# Patient Record
Sex: FEMALE | Race: WHITE | NOT HISPANIC OR LATINO | Employment: OTHER | URBAN - METROPOLITAN AREA
[De-identification: names, ages, dates, MRNs, and addresses within clinical notes are randomized per-mention and may not be internally consistent; named-entity substitution may affect disease eponyms.]

---

## 2017-02-02 ENCOUNTER — PATIENT MESSAGE (OUTPATIENT)
Dept: GASTROENTEROLOGY | Facility: CLINIC | Age: 72
End: 2017-02-02

## 2017-03-01 ENCOUNTER — TELEPHONE (OUTPATIENT)
Dept: GASTROENTEROLOGY | Facility: CLINIC | Age: 72
End: 2017-03-01

## 2017-03-01 NOTE — TELEPHONE ENCOUNTER
----- Message from Rach Rockwell sent at 3/1/2017  3:22 PM CST -----  Contact: pt  States she's returning a call, she's not sure who called her. Please call pt at [phone]. Thank you

## 2017-03-02 ENCOUNTER — HOSPITAL ENCOUNTER (OUTPATIENT)
Dept: RADIOLOGY | Facility: HOSPITAL | Age: 72
Discharge: HOME | End: 2017-03-02
Attending: NURSE PRACTITIONER
Payer: MEDICARE

## 2017-03-02 ENCOUNTER — OFFICE VISIT (OUTPATIENT)
Dept: GASTROENTEROLOGY | Facility: CLINIC | Age: 72
End: 2017-03-02
Payer: MEDICARE

## 2017-03-02 VITALS
WEIGHT: 121.25 LBS | SYSTOLIC BLOOD PRESSURE: 130 MMHG | HEART RATE: 64 BPM | DIASTOLIC BLOOD PRESSURE: 70 MMHG | HEIGHT: 60 IN | BODY MASS INDEX: 23.81 KG/M2

## 2017-03-02 DIAGNOSIS — K59.00 FECAL INCONTINENCE ALTERNATING WITH CONSTIPATION: ICD-10-CM

## 2017-03-02 DIAGNOSIS — R19.4 ALTERED BOWEL HABITS: ICD-10-CM

## 2017-03-02 DIAGNOSIS — K63.5 POLYP OF COLON, UNSPECIFIED PART OF COLON, UNSPECIFIED TYPE: ICD-10-CM

## 2017-03-02 DIAGNOSIS — R19.4 ALTERED BOWEL HABITS: Primary | ICD-10-CM

## 2017-03-02 DIAGNOSIS — R15.9 FECAL INCONTINENCE ALTERNATING WITH CONSTIPATION: ICD-10-CM

## 2017-03-02 PROCEDURE — 74020 XR ABDOMEN FLAT AND ERECT: CPT | Mod: 26,,, | Performed by: RADIOLOGY

## 2017-03-02 PROCEDURE — 74020 XR ABDOMEN FLAT AND ERECT: CPT | Mod: TC,PO

## 2017-03-02 PROCEDURE — 99999 PR PBB SHADOW E&M-EST. PATIENT-LVL III: CPT | Mod: PBBFAC,,, | Performed by: NURSE PRACTITIONER

## 2017-03-02 PROCEDURE — 99214 OFFICE O/P EST MOD 30 MIN: CPT | Mod: S$PBB,,, | Performed by: NURSE PRACTITIONER

## 2017-03-02 RX ORDER — SODIUM, POTASSIUM,MAG SULFATES 17.5-3.13G
1 SOLUTION, RECONSTITUTED, ORAL ORAL ONCE
Qty: 1 BOTTLE | Refills: 0 | Status: SHIPPED | OUTPATIENT
Start: 2017-03-02 | End: 2017-03-02

## 2017-03-02 NOTE — PROGRESS NOTES
Clinic Follow Up:  [facility] Follow Up Note    Reason for Follow Up:  The primary encounter diagnosis was Altered bowel habits. Diagnoses of Fecal incontinence alternating with constipation and Polyp of colon, unspecified part of colon, unspecified type were also pertinent to this visit.    PCP: Primary Doctor No       HPI:  This is a 71 y.o. female here for follow up of the above  She reports no significant improvement in her abdominal discomfort or constipation.  She is no beginning to have occasional fecal incontinence and urgency.    She reports a previous hx of SBO requiring a hospitalization in [place] bout 3 years ago.  At that time, she had a colonoscopy completed but reports that the prep was poor.   She does have a hx of colon polyps, benign per pt.  She denies any melena or hematochezia.  Weight is stable.     Review of Systems   Constitutional: Negative for chills, fever, malaise/fatigue and weight loss.   Respiratory: Negative for cough.    Cardiovascular: Negative for chest pain.   Gastrointestinal:        Per HPI   Musculoskeletal: Negative for myalgias.   Skin: Negative for itching and rash.   Neurological: Negative for headaches.   Psychiatric/Behavioral: The patient is not nervous/anxious.        Medical History:  Past Medical History:   Diagnosis Date    Anemia     Periodic limb movement disorder        Surgical History:   Past Surgical History:   Procedure Laterality Date    APPENDECTOMY      bladder hydrodistinction       HYSTERECTOMY      TUBAL LIGATION         Family History:   Family History   Problem Relation Age of Onset    Heart disease Mother     Heart disease Father        Social History:   Social History   Substance Use Topics    Smoking status: Never Smoker    Smokeless tobacco: Never Used    Alcohol use No      Comment: 1 marguerita monthly       Allergies: Reviewed    Home Medications:  Current Outpatient Prescriptions on File Prior to Visit    Medication Sig Dispense Refill    alprazolam (XANAX) 1 MG tablet TK 1 T PO BID  5    estradiol (ESTRACE) 1 MG tablet TK 1 T PO QD  5    furosemide (LASIX) 20 MG tablet TK 1 T PO QD  5    gabapentin (NEURONTIN) 100 MG capsule       ibandronate (BONIVA) 150 mg tablet TK 1 T PO Q MONTH  5    perphenazine-amitriptyline 2-10 mg (TRIAVIL 2-10) 2-10 mg Tab TK 1 T PO BID  5    hydrocodone-acetaminophen 7.5-325mg (NORCO) 7.5-325 mg per tablet TK 1 T PO Q 6 TO 8 H PRN P  0    [DISCONTINUED] amitriptyline (ELAVIL) 100 MG tablet       [DISCONTINUED] zolpidem (AMBIEN) 10 mg Tab TK 1 T PO QHS PRN  0     No current facility-administered medications on file prior to visit.        Physical Exam:  Vital Signs:  /70  Pulse 64  Ht 5' (1.524 m)  Wt 55 kg (121 lb 4.1 oz)  BMI 23.68 kg/m2  Body mass index is 23.68 kg/(m^2).  Physical Exam   Constitutional: She is oriented to person, place, and time. She appears well-developed and well-nourished.   HENT:   Head: Normocephalic.   Eyes: No scleral icterus.   Neck: Normal range of motion.   Cardiovascular: Normal rate and regular rhythm.    Pulmonary/Chest: Effort normal and breath sounds normal.   Abdominal: Soft. Bowel sounds are normal. She exhibits no distension. There is no tenderness.   Musculoskeletal: Normal range of motion.   Neurological: She is alert and oriented to person, place, and time.   Skin: Skin is warm and dry.   Psychiatric: She has a normal mood and affect.   Vitals reviewed.      Labs: Pertinent labs reviewed.      Assessment:  1. Altered bowel habits    2. Fecal incontinence alternating with constipation    3. Polyp of colon, unspecified part of colon, unspecified type        Recommendations:  R/O obstruction given her PMH  - Will plan for colonoscopy with Suprep given her hx of colon polyps and new onset fecal incontinence and urgency.  Continue Miralax   If imaging and endoscopy unremarkable, will plan to start either Linzess or Amitiza for  improved control of constipation.   Altered bowel habits  -     X-Ray Abdomen Flat And Erect; Future; Expected date: 3/2/17  -     Case request GI: COLONOSCOPY  -     sodium,potassium,mag sulfates (SUPREP BOWEL PREP KIT) 17.5-3.13-1.6 gram SolR; Take 1 Units by mouth once.  Dispense: 1 Bottle; Refill: 0    Fecal incontinence alternating with constipation  -     X-Ray Abdomen Flat And Erect; Future; Expected date: 3/2/17  -     Case request GI: COLONOSCOPY  -     sodium,potassium,mag sulfates (SUPREP BOWEL PREP KIT) 17.5-3.13-1.6 gram SolR; Take 1 Units by mouth once.  Dispense: 1 Bottle; Refill: 0    Polyp of colon, unspecified part of colon, unspecified type  -     Case request GI: COLONOSCOPY  -     sodium,potassium,mag sulfates (SUPREP BOWEL PREP KIT) 17.5-3.13-1.6 gram SolR; Take 1 Units by mouth once.  Dispense: 1 Bottle; Refill: 0        Return to Clinic:    Return in about 6 weeks (around 4/13/2017).

## 2017-03-02 NOTE — MR AVS SNAPSHOT
[facility]  [address] [address]  Phone: [phone]  Fax: [phone]                  Macie Charles   3/2/2017 11:00 AM   Office Visit    Description:  Female : 1945   Provider:  JENARO Hernandez   Department:  [facility]           Reason for Visit     Constipation           Diagnoses this Visit        Comments    Altered bowel habits    -  Primary     Fecal incontinence alternating with constipation         Polyp of colon, unspecified part of colon, unspecified type                To Do List           Future Appointments        Provider Department Dept Phone    3/2/2017 11:00 AM JENARO Hernandez [facility] [phone]    3/2/2017 11:30 AM [facility] XR2 [facility] [phone]    2017 10:40 AM JENARO Hernandez [facility] [phone]      Your Future Surgeries/Procedures     Mar 14, 2017   Surgery with Eder Calvo MD   [facility])    [address] [address] [phone]              Goals (5 Years of Data)     None      Follow-Up and Disposition     Return in about 6 weeks (around 2017).       These Medications        Disp Refills Start End    sodium,potassium,mag sulfates (SUPREP BOWEL PREP KIT) 17.5-3.13-1.6 gram SolR 1 Bottle 0 3/2/2017 3/2/2017    Take 1 Units by mouth once. - Oral    Pharmacy: [email or web address][note 3[facility] - [place] - [address] AT [address] Ph #: [phone]         [facility]     [facility] Nurse Care [facility] -  Assistance  Registered nurses in the [facility] provide clinical advisement, health education, appointment booking, and other advisory services.  Call for this free service at [phone].             Medications           Message regarding Medications     Verify the changes and/or additions to your medication regime listed  below are the same as discussed with your clinician today.  If any of these changes or additions are incorrect, please notify your healthcare provider.        START taking these NEW medications        Refills    sodium,potassium,mag sulfates (SUPREP BOWEL PREP KIT) 17.5-3.13-1.6 gram SolR 0    Sig: Take 1 Units by mouth once.    Class: Normal    Route: Oral      STOP taking these medications     amitriptyline (ELAVIL) 100 MG tablet     zolpidem (AMBIEN) 10 mg Tab TK 1 T PO QHS PRN           Verify that the below list of medications is an accurate representation of the medications you are currently taking.  If none reported, the list may be blank. If incorrect, please contact your healthcare provider. Carry this list with you in case of emergency.           Current Medications     alprazolam (XANAX) 1 MG tablet TK 1 T PO BID    estradiol (ESTRACE) 1 MG tablet TK 1 T PO QD    furosemide (LASIX) 20 MG tablet TK 1 T PO QD    gabapentin (NEURONTIN) 100 MG capsule     ibandronate (BONIVA) 150 mg tablet TK 1 T PO Q MONTH    perphenazine-amitriptyline 2-10 mg (TRIAVIL 2-10) 2-10 mg Tab TK 1 T PO BID    hydrocodone-acetaminophen 7.5-325mg (NORCO) 7.5-325 mg per tablet TK 1 T PO Q 6 TO 8 H PRN P    sodium,potassium,mag sulfates (SUPREP BOWEL PREP KIT) 17.5-3.13-1.6 gram SolR Take 1 Units by mouth once.           Clinical Reference Information           Your Vitals Were     BP                   130/70           Blood Pressure          Most Recent Value    BP  130/70      Allergies as of 3/2/2017     Ceftin [Cefuroxime Axetil]    Ciprofloxacin    Morphine    Pcn [Penicillins]      Immunizations Administered on Date of Encounter - 3/2/2017     None      Orders Placed During Today's Visit      Normal Orders This Visit    Case request GI: COLONOSCOPY     Future Labs/Procedures Expected by Expires    X-Ray Abdomen Flat And Erect  3/2/2017 3/2/2018      Language Assistance Services     ATTENTION: Language assistance services are  available, free of charge. Please call [phone].      ATENCIÓN: Si habla español, tiene a castro disposición servicios gratuitos de asistencia lingüística. Llame al [phone].     CHÚ Ý: N?u b?n nói Ti?ng Vi?t, có các d?ch v? h? tr? ngôn ng? mi?n phí dành cho b?n. G?i s? [phone].         [facility] complies with applicable Federal civil rights laws and does not discriminate on the basis of race, color, national origin, age, disability, or sex.

## 2017-03-08 ENCOUNTER — OFFICE VISIT (OUTPATIENT)
Dept: UROLOGY | Facility: CLINIC | Age: 72
End: 2017-03-08
Payer: MEDICARE

## 2017-03-08 VITALS
HEART RATE: 78 BPM | WEIGHT: 123.44 LBS | BODY MASS INDEX: 24.11 KG/M2 | SYSTOLIC BLOOD PRESSURE: 132 MMHG | DIASTOLIC BLOOD PRESSURE: 80 MMHG

## 2017-03-08 DIAGNOSIS — N32.81 OAB (OVERACTIVE BLADDER): Primary | ICD-10-CM

## 2017-03-08 DIAGNOSIS — R30.0 DYSURIA: ICD-10-CM

## 2017-03-08 LAB
BILIRUB SERPL-MCNC: NORMAL MG/DL
BILIRUB UR QL STRIP: NEGATIVE
BLOOD URINE, POC: NORMAL
CLARITY UR REFRACT.AUTO: CLEAR
COLOR UR AUTO: YELLOW
COLOR, POC UA: YELLOW
GLUCOSE UR QL STRIP: NEGATIVE
GLUCOSE UR QL STRIP: NORMAL
HGB UR QL STRIP: NEGATIVE
KETONES UR QL STRIP: NEGATIVE
KETONES UR QL STRIP: NORMAL
LEUKOCYTE ESTERASE UR QL STRIP: ABNORMAL
LEUKOCYTE ESTERASE URINE, POC: NORMAL
MICROSCOPIC COMMENT: NORMAL
NITRITE UR QL STRIP: NEGATIVE
NITRITE, POC UA: NORMAL
PH UR STRIP: 7 [PH] (ref 5–8)
PH, POC UA: 5
POC RESIDUAL URINE VOLUME: 72 ML (ref 0–100)
PROT UR QL STRIP: NEGATIVE
PROTEIN, POC: NORMAL
RBC #/AREA URNS AUTO: 1 /HPF (ref 0–4)
SP GR UR STRIP: 1.01 (ref 1–1.03)
SPECIFIC GRAVITY, POC UA: 1.02
URN SPEC COLLECT METH UR: ABNORMAL
UROBILINOGEN UR STRIP-ACNC: NEGATIVE EU/DL
UROBILINOGEN, POC UA: NORMAL
WBC #/AREA URNS AUTO: 2 /HPF (ref 0–5)

## 2017-03-08 PROCEDURE — 51798 US URINE CAPACITY MEASURE: CPT | Mod: PBBFAC | Performed by: UROLOGY

## 2017-03-08 PROCEDURE — 81002 URINALYSIS NONAUTO W/O SCOPE: CPT | Mod: PBBFAC | Performed by: UROLOGY

## 2017-03-08 PROCEDURE — 87086 URINE CULTURE/COLONY COUNT: CPT

## 2017-03-08 PROCEDURE — 99204 OFFICE O/P NEW MOD 45 MIN: CPT | Mod: S$PBB,,, | Performed by: UROLOGY

## 2017-03-08 PROCEDURE — 99999 PR PBB SHADOW E&M-EST. PATIENT-LVL III: CPT | Mod: PBBFAC,,, | Performed by: UROLOGY

## 2017-03-08 PROCEDURE — 81001 URINALYSIS AUTO W/SCOPE: CPT

## 2017-03-08 PROCEDURE — 99213 OFFICE O/P EST LOW 20 MIN: CPT | Mod: PBBFAC | Performed by: UROLOGY

## 2017-03-08 NOTE — MR AVS SNAPSHOT
[facility]  [address] [address]  Phone: [phone]  Fax: [phone]                  Macie Charles   3/8/2017 11:20 AM   Office Visit    Description:  Female : 1945   Provider:  Jones Mireles IV, MD   Department:  [facility]           Diagnoses this Visit        Comments    OAB (overactive bladder)    -  Primary     Dysuria                To Do List           Future Appointments        Provider Department Dept Phone    3/8/2017 11:20 AM Jones Mireles IV, MD [facility] [phone]    2017 10:40 AM Kristine Cano [facility] [phone]      Your Future Surgeries/Procedures     Mar 14, 2017   Surgery with Eder Calvo MD   [facility])    [address] [address] [phone]              Goals (5 Years of Data)     None      Follow-Up and Disposition     Return if symptoms worsen or fail to improve.      [facility]     [facility] Care [facility] -  Assistance  Registered nurses in the [facility] provide clinical advisement, health education, appointment booking, and other advisory services.  Call for this free service at [phone].             Medications           Message regarding Medications     Verify the changes and/or additions to your medication regime listed below are the same as discussed with your clinician today.  If any of these changes or additions are incorrect, please notify your healthcare provider.             Verify that the below list of medications is an accurate representation of the medications you are currently taking.  If none reported, the list may be blank. If incorrect, please contact your healthcare provider. Carry this list with you in case of emergency.           Current Medications     alprazolam (XANAX) 1 MG tablet TK 1 T PO BID    estradiol (ESTRACE) 1 MG tablet TK 1 T PO QD     furosemide (LASIX) 20 MG tablet TK 1 T PO QD    gabapentin (NEURONTIN) 100 MG capsule     ibandronate (BONIVA) 150 mg tablet TK 1 T PO Q MONTH    perphenazine-amitriptyline 2-10 mg (TRIAVIL 2-10) 2-10 mg Tab TK 1 T PO BID    hydrocodone-acetaminophen 7.5-325mg (NORCO) 7.5-325 mg per tablet TK 1 T PO Q 6 TO 8 H PRN P           Clinical Reference Information           Your Vitals Were     BP Pulse Weight BMI       132/80 (BP Location: Right arm, Patient Position: Sitting) 78 56 kg (123 lb 7.3 oz) 24.11 kg/m2       Blood Pressure          Most Recent Value    BP  132/80      Allergies as of 3/8/2017     Ceftin [Cefuroxime Axetil]    Ciprofloxacin    Morphine    Pcn [Penicillins]      Immunizations Administered on Date of Encounter - 3/8/2017     None      Orders Placed During Today's Visit      Normal Orders This Visit    Urinalysis     Urine culture       Language Assistance Services     ATTENTION: Language assistance services are available, free of charge. Please call [phone].      ATENCIÓN: Si habla consuelo, tiene a castro disposición servicios gratuitos de asistencia lingüística. Llame al [phone].     SALBADOR Ý: N?u b?n nói Ti?ng Vi?t, có các d?ch v? h? tr? ngôn ng? mi?n phí dành cho b?n. G?i s? [phone].         [facility] complies with applicable Federal civil rights laws and does not discriminate on the basis of race, color, national origin, age, disability, or sex.

## 2017-03-08 NOTE — PROGRESS NOTES
Chief Complaint: Varied LUTS    HPI:   3/8/17: 70 yo woman has not felt well for some years (tired, colds, urinary problems, sinus problems, etc).  Normal CT chest/abdomen 11/16.  Was told she had trace blood in her urine yesterday.  Says she was dx with IC 15 years ago. Has dysuria/OAB from time to time as a spell.  Has had hydrodistention 4 times and her symptoms are improved by that.  Last time was last March. No sig abd/pelvic pain and no exac/rel factors.  No hematuria.  No urolithiasis.  No urinary bother today except mild dysuria.  Has had epidural back injections.  AZO helps.  Constipation is a chronic problem not solved by miralax.    Allergies:  Ceftin [cefuroxime axetil]; Ciprofloxacin; Morphine; and Pcn [penicillins]    Medications: has a current medication list which includes the following prescription(s): alprazolam, estradiol, furosemide, gabapentin, ibandronate, perphenazine-amitriptyline 2-10 mg, and hydrocodone-acetaminophen 7.5-325mg.    Review of Systems:  General: No fever, chills, fatigability, or weight loss.  Skin: No rashes, itching, or changes in color or texture of skin.  Chest: Denies CHAPARRO, cyanosis, wheezing, cough, and sputum production.  Abdomen: Appetite fine. No weight loss. Denies diarrhea, abdominal pain, hematemesis, or blood in stool.  Musculoskeletal: Some joint stiffness or swelling. Some back pain.  : As above.  All other review of systems negative.    PMH:   has a past medical history of Anemia and Periodic limb movement disorder.    PSH:   has a past surgical history that includes Hysterectomy; Tubal ligation; Appendectomy; and bladder hydrodistinction .    FamHx: family history includes Heart disease in her father and mother.    SocHx:  reports that she has never smoked. She has never used smokeless tobacco. She reports that she does not drink alcohol or use illicit drugs.     Physical Exam:  Vitals:   Vitals:    03/08/17 1044   BP: 132/80   Pulse: 78     General: A&Ox3.  No apparent distress. No deformities.  Neck: No masses. Normal thyroid.  Lungs: normal inspiration. No use of accessory muscles.  Heart: normal pulse. No arrhythmias.  Abdomen: Soft. NT. ND. No masses. No hernias. No hepatosplenomegaly.  Lymphatic: Neck and groin nodes negative.  Skin: The skin is warm and dry. No jaundice.  Ext: No c/c/e.  : External genitalia normal.     Labs/Studies:   Bladder Scan performed in office: PVR 72 ml.    Impression/Plan:   1. Pt has OAB and unlikely has UTI.  Will check cath UA/UCx today and see if UTI is present.  Constipation would be one reason for her OAB but she can't say when she hasn't been.    2. Cath UA/UCx when symptoms recur.

## 2017-03-10 LAB — BACTERIA UR CULT: NO GROWTH

## 2017-03-14 ENCOUNTER — ANESTHESIA EVENT (OUTPATIENT)
Dept: ENDOSCOPY | Facility: HOSPITAL | Age: 72
End: 2017-03-14
Payer: MEDICARE

## 2017-03-14 ENCOUNTER — SURGERY (OUTPATIENT)
Age: 72
End: 2017-03-14

## 2017-03-14 ENCOUNTER — HOSPITAL ENCOUNTER (OUTPATIENT)
Facility: HOSPITAL | Age: 72
Discharge: HOME | End: 2017-03-14
Attending: INTERNAL MEDICINE | Admitting: INTERNAL MEDICINE
Payer: MEDICARE

## 2017-03-14 ENCOUNTER — ANESTHESIA (OUTPATIENT)
Dept: ENDOSCOPY | Facility: HOSPITAL | Age: 72
End: 2017-03-14
Payer: MEDICARE

## 2017-03-14 VITALS
RESPIRATION RATE: 17 BRPM | BODY MASS INDEX: 22.58 KG/M2 | HEART RATE: 68 BPM | TEMPERATURE: 98 F | SYSTOLIC BLOOD PRESSURE: 132 MMHG | WEIGHT: 115 LBS | HEIGHT: 60 IN | OXYGEN SATURATION: 99 % | DIASTOLIC BLOOD PRESSURE: 67 MMHG

## 2017-03-14 VITALS — RESPIRATION RATE: 21 BRPM

## 2017-03-14 DIAGNOSIS — R19.4 ALTERED BOWEL HABITS: Primary | ICD-10-CM

## 2017-03-14 DIAGNOSIS — R15.9 FECAL INCONTINENCE ALTERNATING WITH CONSTIPATION: ICD-10-CM

## 2017-03-14 DIAGNOSIS — Z86.010 HISTORY OF COLON POLYPS: ICD-10-CM

## 2017-03-14 DIAGNOSIS — K59.00 FECAL INCONTINENCE ALTERNATING WITH CONSTIPATION: ICD-10-CM

## 2017-03-14 LAB — POCT GLUCOSE: 105 MG/DL (ref 70–110)

## 2017-03-14 PROCEDURE — 37000008 HC ANESTHESIA 1ST 15 MINUTES: Performed by: FAMILY MEDICINE

## 2017-03-14 PROCEDURE — 82962 GLUCOSE BLOOD TEST: CPT | Performed by: FAMILY MEDICINE

## 2017-03-14 PROCEDURE — 25000003 PHARM REV CODE 250: Performed by: FAMILY MEDICINE

## 2017-03-14 PROCEDURE — 45378 DIAGNOSTIC COLONOSCOPY: CPT | Mod: 53,,, | Performed by: FAMILY MEDICINE

## 2017-03-14 PROCEDURE — 45378 DIAGNOSTIC COLONOSCOPY: CPT | Performed by: FAMILY MEDICINE

## 2017-03-14 PROCEDURE — 25000003 PHARM REV CODE 250: Performed by: NURSE ANESTHETIST, CERTIFIED REGISTERED

## 2017-03-14 PROCEDURE — 37000009 HC ANESTHESIA EA ADD 15 MINS: Performed by: FAMILY MEDICINE

## 2017-03-14 PROCEDURE — 63600175 PHARM REV CODE 636 W HCPCS: Performed by: NURSE ANESTHETIST, CERTIFIED REGISTERED

## 2017-03-14 RX ORDER — SODIUM CHLORIDE, SODIUM LACTATE, POTASSIUM CHLORIDE, CALCIUM CHLORIDE 600; 310; 30; 20 MG/100ML; MG/100ML; MG/100ML; MG/100ML
INJECTION, SOLUTION INTRAVENOUS CONTINUOUS
Status: DISCONTINUED | OUTPATIENT
Start: 2017-03-14 | End: 2017-03-14 | Stop reason: HOSPADM

## 2017-03-14 RX ORDER — PROMETHAZINE HYDROCHLORIDE 25 MG/1
25 TABLET ORAL EVERY 6 HOURS PRN
Qty: 6 TABLET | Refills: 0 | Status: SHIPPED | OUTPATIENT
Start: 2017-03-14

## 2017-03-14 RX ORDER — SODIUM, POTASSIUM,MAG SULFATES 17.5-3.13G
SOLUTION, RECONSTITUTED, ORAL ORAL
Qty: 354 ML | Refills: 0 | Status: SHIPPED | OUTPATIENT
Start: 2017-03-14 | End: 2017-03-14

## 2017-03-14 RX ORDER — LIDOCAINE HYDROCHLORIDE 10 MG/ML
INJECTION INFILTRATION; PERINEURAL
Status: DISCONTINUED | OUTPATIENT
Start: 2017-03-14 | End: 2017-03-14

## 2017-03-14 RX ORDER — SODIUM, POTASSIUM,MAG SULFATES 17.5-3.13G
SOLUTION, RECONSTITUTED, ORAL ORAL
Qty: 354 ML | Refills: 0 | Status: ON HOLD | OUTPATIENT
Start: 2017-03-14 | End: 2017-03-15 | Stop reason: HOSPADM

## 2017-03-14 RX ORDER — PROPOFOL 10 MG/ML
VIAL (ML) INTRAVENOUS
Status: DISCONTINUED | OUTPATIENT
Start: 2017-03-14 | End: 2017-03-14

## 2017-03-14 RX ORDER — PROMETHAZINE HYDROCHLORIDE 25 MG/1
25 TABLET ORAL EVERY 6 HOURS PRN
Qty: 6 TABLET | Refills: 0 | Status: SHIPPED | OUTPATIENT
Start: 2017-03-14 | End: 2017-03-14

## 2017-03-14 RX ADMIN — LIDOCAINE HYDROCHLORIDE 50 MG: 10 INJECTION, SOLUTION INFILTRATION; PERINEURAL at 08:03

## 2017-03-14 RX ADMIN — PROPOFOL 10 MG: 10 INJECTION, EMULSION INTRAVENOUS at 08:03

## 2017-03-14 RX ADMIN — PROPOFOL 20 MG: 10 INJECTION, EMULSION INTRAVENOUS at 08:03

## 2017-03-14 RX ADMIN — PROPOFOL 50 MG: 10 INJECTION, EMULSION INTRAVENOUS at 08:03

## 2017-03-14 NOTE — TRANSFER OF CARE
Anesthesia Transfer of Care Note    Patient: Macie Charles    Procedure(s) Performed: Procedure(s) (LRB):  COLONOSCOPY (N/A)    Patient location: PACU    Anesthesia Type: MAC    Transport from OR: Transported from OR on room air with adequate spontaneous ventilation    Post pain: adequate analgesia    Post assessment: no apparent anesthetic complications and tolerated procedure well    Post vital signs: stable    Level of consciousness: awake    Nausea/Vomiting: no nausea/vomiting    Complications: none          Last vitals:   Visit Vitals    BP (!) 153/75 (BP Location: Left arm, Patient Position: Lying, BP Method: Automatic)    Pulse 76    Temp 36.4 °C (97.6 °F) (Oral)    Resp 18    Ht 5' (1.524 m)    Wt 52.2 kg (115 lb)    SpO2 100%    Breastfeeding No    BMI 22.46 kg/m2

## 2017-03-14 NOTE — DISCHARGE SUMMARY
Endoscopy Discharge Summary      Admit Date: 3/14/2017    Discharge Date and Time:  3/14/2017 9:08 AM    Attending Physician: Eder Calvo MD     Discharge Physician: Eder Calvo MD     Principal Admitting Diagnoses: Altered bowel habits         Discharge Diagnosis: The primary encounter diagnosis was Altered bowel habits. Diagnoses of History of colon polyps and Fecal incontinence alternating with constipation were also pertinent to this visit.     Discharged Condition: Good    Indication for Admission: Altered bowel habits     Hospital Course: Patient was admitted for an inpatient procedure and tolerated the procedure well with no complications.    Significant Diagnostic Studies: Colonoscopy    Pathology (if any):  Specimen     None          Estimated Blood Loss: 0 ml.    Discussed with: patient and family.    Disposition: Home.    Follow Up/Patient Instructions:   Current Discharge Medication List      START taking these medications    Details   promethazine (PHENERGAN) 25 MG tablet Take 1 tablet (25 mg total) by mouth every 6 (six) hours as needed for Nausea.  Qty: 6 tablet, Refills: 0      sodium,potassium,mag sulfates (SUPREP BOWEL PREP KIT) 17.5-3.13-1.6 gram SolR Take as instructed on prep sheet  Qty: 354 mL, Refills: 0         CONTINUE these medications which have NOT CHANGED    Details   alprazolam (XANAX) 1 MG tablet TK 1 T PO BID  Refills: 5      estradiol (ESTRACE) 1 MG tablet TK 1 T PO QD  Refills: 5      furosemide (LASIX) 20 MG tablet TK 1 T PO QD  Refills: 5      gabapentin (NEURONTIN) 100 MG capsule       ibandronate (BONIVA) 150 mg tablet TK 1 T PO Q MONTH  Refills: 5      perphenazine-amitriptyline 2-10 mg (TRIAVIL 2-10) 2-10 mg Tab TK 1 T PO BID  Refills: 5      hydrocodone-acetaminophen 7.5-325mg (NORCO) 7.5-325 mg per tablet TK 1 T PO Q 6 TO 8 H PRN P  Refills: 0             No discharge procedures on file.    Follow-up Information     Follow up with Jonathan aVlverde MD. Go in 1  day.    Specialty:  Gastroenterology    Why:  for a repeat colonoscopy    Contact information:    [address] [place] [address] [address] John [address] [address]  [phone]            @[facility]([id number])@

## 2017-03-14 NOTE — ANESTHESIA PREPROCEDURE EVALUATION
03/14/2017  Macie Charles is a 71 y.o., female.    [facility] Anesthesia Evaluation    I have reviewed the Patient Summary Reports.    I have reviewed the Nursing Notes.   I have reviewed the Medications.     Review of Systems  Anesthesia Hx:  No problems with previous Anesthesia  Denies Family Hx of Anesthesia complications.   Denies Personal Hx of Anesthesia complications.   Social:  Non-Smoker, No Alcohol Use    Hematology/Oncology:     Oncology Normal    -- Anemia:   Cardiovascular:   Denies Hypertension.  Denies MI.   Denies CABG/stent.         Pulmonary:   Denies COPD.  Denies Asthma.  Denies Sleep Apnea.    Renal/:  Renal/ Normal     Hepatic/GI:   Bowel Prep. GERD, poorly controlled Denies Liver Disease. Denies Hepatitis.    Musculoskeletal:  Musculoskeletal Normal    Neurological:   Denies CVA. Denies Seizures.    Endocrine:   Diabetes, type 2    Psych:   depression          Physical Exam  General:  Well nourished    Airway/Jaw/Neck:  Airway Findings: Mouth Opening: Normal Tongue: Normal  General Airway Assessment: Adult  Mallampati: II      Dental:  Dental Findings: In tact   Chest/Lungs:  Chest/Lungs Findings: Normal Respiratory Rate, Clear to auscultation     Heart/Vascular:  Heart Findings: Rate: Normal  Rhythm: Regular Rhythm  Sounds: Normal             Anesthesia Plan  Type of Anesthesia, risks & benefits discussed:  Anesthesia Type:  MAC  Patient's Preference:   Intra-op Monitoring Plan: standard ASA monitors  Intra-op Monitoring Plan Comments:   Post Op Pain Control Plan:   Post Op Pain Control Plan Comments:   Induction:   IV  Beta Blocker:  Patient is not currently on a Beta-Blocker (No further documentation required).       Informed Consent: Patient understands risks and agrees with Anesthesia plan.  Questions answered. Anesthesia consent signed with patient.  ASA Score: 2     Day of  Surgery Review of History & Physical: I have interviewed and examined the patient. I have reviewed the patient's H&P dated:  There are no significant changes.  H&P update referred to the surgeon.         Ready For Surgery From Anesthesia Perspective.

## 2017-03-14 NOTE — PLAN OF CARE
Dr came to bedside and discussed findings. NO N/V,  no abdominal pain, no GI bleeding, and vitals stable.  Pt discharged from unit.

## 2017-03-14 NOTE — ANESTHESIA RELEASE NOTE
Anesthesia Release from PACU Note    Patient: Macie Charles    Procedure(s) Performed: Procedure(s) (LRB):  COLONOSCOPY (N/A)    Anesthesia type: MAC    Post pain: Adequate analgesia    Post assessment: no apparent anesthetic complications, tolerated procedure well and no evidence of recall    Last Vitals:   Visit Vitals    BP (!) 153/75 (BP Location: Left arm, Patient Position: Lying, BP Method: Automatic)    Pulse 76    Temp 36.4 °C (97.6 °F) (Oral)    Resp 18    Ht 5' (1.524 m)    Wt 52.2 kg (115 lb)    SpO2 100%    Breastfeeding No    BMI 22.46 kg/m2       Post vital signs: stable    Level of consciousness: awake    Nausea/Vomiting: no nausea/no vomiting    Complications: none    Airway Patency: patent    Respiratory: unassisted, spontaneous ventilation, room air    Cardiovascular: stable and blood pressure at baseline    Hydration: euvolemic

## 2017-03-14 NOTE — ANESTHESIA POSTPROCEDURE EVALUATION
Anesthesia Post Evaluation    Patient: Macie Charles    Procedure(s) Performed: Procedure(s) (LRB):  COLONOSCOPY (N/A)    Final Anesthesia Type: MAC  Patient location during evaluation: PACU  Patient participation: Yes- Able to Participate  Level of consciousness: awake  Post-procedure vital signs: reviewed and stable  Pain management: adequate  Airway patency: patent  PONV status at discharge: No PONV  Anesthetic complications: no      Cardiovascular status: blood pressure returned to baseline and hemodynamically stable  Respiratory status: unassisted, spontaneous ventilation and room air  Hydration status: euvolemic  Follow-up not needed.        Visit Vitals    BP (!) 153/75 (BP Location: Left arm, Patient Position: Lying, BP Method: Automatic)    Pulse 76    Temp 36.4 °C (97.6 °F) (Oral)    Resp 18    Ht 5' (1.524 m)    Wt 52.2 kg (115 lb)    SpO2 100%    Breastfeeding No    BMI 22.46 kg/m2       Pain/Alessio Score: No Data Recorded

## 2017-03-14 NOTE — IP AVS SNAPSHOT
[address] [place] [address]           Patient Discharge Instructions     Our goal is to set you up for success. This packet includes information on your condition, medications, and your home care. It will help you to care for yourself so you don't get sicker and need to go back to the hospital.     Please ask your nurse if you have any questions.        There are many details to remember when preparing to leave the hospital. Here is what you will need to do:    1. Take your medicine. If you are prescribed medications, review your Medication List in the following pages. You may have new medications to  at the pharmacy and others that you'll need to stop taking. Review the instructions for how and when to take your medications. Talk with your doctor or nurses if you are unsure of what to do.     2. Go to your follow-up appointments. Specific follow-up information is listed in the following pages. Your may be contacted by a transition nurse or clinical provider about future appointments. Be sure we have all of the phone numbers to reach you, if needed. Please contact your provider's office if you are unable to make an appointment.     3. Watch for warning signs. Your doctor or nurse will give you detailed warning signs to watch for and when to call for assistance. These instructions may also include educational information about your condition. If you experience any of warning signs to your health, call your doctor.               ** Verify the list of medication(s) below is accurate and up to date. Carry this with you in case of emergency. If your medications have changed, please notify your healthcare provider.             Medication List      START taking these medications        Additional Info                      promethazine 25 MG tablet   Commonly known as:  PHENERGAN   Quantity:  6 tablet   Refills:  0   Dose:  25 mg    Instructions:  Take 1 tablet (25  mg total) by mouth every 6 (six) hours as needed for Nausea.     Begin Date    AM    Noon    PM    Bedtime       sodium,potassium,mag sulfates 17.5-3.13-1.6 gram Solr   Commonly known as:  SUPREP BOWEL PREP KIT   Quantity:  354 mL   Refills:  0    Instructions:  Take as instructed on prep sheet     Begin Date    AM    Noon    PM    Bedtime         CONTINUE taking these medications        Additional Info                      alprazolam 1 MG tablet   Commonly known as:  XANAX   Refills:  5    Instructions:  TK 1 T PO BID     Begin Date    AM    Noon    PM    Bedtime       estradiol 1 MG tablet   Commonly known as:  ESTRACE   Refills:  5    Instructions:  TK 1 T PO QD     Begin Date    AM    Noon    PM    Bedtime       furosemide 20 MG tablet   Commonly known as:  LASIX   Refills:  5    Instructions:  TK 1 T PO QD     Begin Date    AM    Noon    PM    Bedtime       gabapentin 100 MG capsule   Commonly known as:  NEURONTIN   Refills:  0      Begin Date    AM    Noon    PM    Bedtime       hydrocodone-acetaminophen 7.5-325mg 7.5-325 mg per tablet   Commonly known as:  NORCO   Refills:  0    Instructions:  TK 1 T PO Q 6 TO 8 H PRN P     Begin Date    AM    Noon    PM    Bedtime       ibandronate 150 mg tablet   Commonly known as:  BONIVA   Refills:  5    Instructions:  TK 1 T PO Q MONTH     Begin Date    AM    Noon    PM    Bedtime       perphenazine-amitriptyline 2-10 mg 2-10 mg Tab   Commonly known as:  TRIAVIL 2-10   Refills:  5    Instructions:  TK 1 T PO BID     Begin Date    AM    Noon    PM    Bedtime            Where to Get Your Medications      You can get these medications from any pharmacy     Bring a paper prescription for each of these medications     promethazine 25 MG tablet    sodium,potassium,mag sulfates 17.5-3.13-1.6 gram Solr                  Please bring to all follow up appointments:    1. A copy of your discharge instructions.  2. All medicines you are currently taking in their original  bottles.  3. Identification and insurance card.    Please arrive 15 minutes ahead of scheduled appointment time.    Please call 24 hours in advance if you must reschedule your appointment and/or time.        Your Scheduled Appointments     Apr 20, 2017 10:40 AM CDT   GASTROENTEROLOGY ESTABLISHED PATIENT with JENARO Hernandez   [facility] ([facility])    [address] [facility] [address]  [facility] [id number] [phone]              Your Future Surgeries/Procedures     Mar 15, 2017   Surgery with Jonathan Valverde MD   [facility]  [place] [place]    [address]  [facility] [id number] [phone]              Follow-up Information     Follow up with Jonathan Valverde MD. Go in 1 day.    Specialty:  Gastroenterology    Why:  for a repeat colonoscopy    Contact information:    [address] [place] [address] [place] [address]  [phone]            Primary Diagnosis     Your primary diagnosis was:  Change In Bowel Habits      Admission Information     Date & Time Provider Department CSN    3/14/2017  7:01 AM Eder Calvo MD [facility] [id number]      Care Providers     Provider Role Specialty Primary office phone    Eder Calvo MD Attending Provider Family Medicine [phone]    Eder Calvo MD Surgeon  Family Medicine [phone]      Your Vitals Were     BP Pulse Temp Resp Height Weight    132/67 68 98.3 °F (36.8 °C) (Oral) 17 5' (1.524 m) 52.2 kg (115 lb)    SpO2 BMI             99% 22.46 kg/m2         Recent Lab Values        11/15/2016                           7:26 AM           A1C 5.2           Comment for A1C at  7:26 AM on 11/15/2016:  According to ADA guidelines, hemoglobin A1C <7.0% represents  optimal control in non-pregnant diabetic patients.  Different  metrics may apply to specific populations.   Standards of Medical Care in Diabetes - 2016.  For the purpose of screening for the presence of diabetes:  <5.7%     Consistent with  the absence of diabetes  5.7-6.4%  Consistent with increasing risk for diabetes   (prediabetes)  >or=6.5%  Consistent with diabetes  Currently no consensus exists for use of hemoglobin A1C  for diagnosis of diabetes for children.        Allergies as of 3/14/2017        Reactions    Ceftin [Cefuroxime Axetil] Rash    Ciprofloxacin Rash    Morphine Rash    Pcn [Penicillins] Rash      [facility]     [facility] Care Line - 24/7 Assistance  Unless otherwise directed by your provider, please contact [facility], our nurse care line that is available for 24/7 assistance.     Registered nurses in the [facility] provide clinical advisement, health education, appointment booking, and other advisory services.  Call for this free service at [phone].        Advance Directives     An advance directive is a document which, in the event you are no longer able to make decisions for yourself, tells your healthcare team what kind of treatment you do or do not want to receive, or who you would like to make those decisions for you.  If you do not currently have an advance directive, [facility] encourages you to create one.  For more information call:  [phone]), [phone] ([phone]),  or log on to [email or web address].        Language Assistance Services     ATTENTION: Language assistance services are available, free of charge. Please call [phone].      ATENCIÓN: Si habla español, tiene a castro disposición servicios gratuitos de asistencia lingüística. Llame al [phone].     [facility] Ý: N?u b?n nói Ti?ng Vi?t, có các d?ch v? h? tr? ngôn ng? mi?n phí dành cho b?n. G?i s? [phone].         [facility]  complies with applicable Federal civil rights laws and does not discriminate on the basis of race, color, national origin, age, disability, or sex.

## 2017-03-14 NOTE — H&P
Short Stay Endoscopy History and Physical    PCP - Primary Doctor No    Procedure - Colonoscopy  ASA - 2  Mallampati - per anesthesia  History of Anesthesia problems - no  Family history Anesthesia problems -  no     HPI:  This is a 71 y.o. female here for evaluation of :   Active Hospital Problems    Diagnosis  POA    *Altered bowel habits [R19.4]  Yes    History of colon polyps [Z86.010]  Not Applicable    Fecal incontinence alternating with constipation [R15.9, K59.00]  Yes      Resolved Hospital Problems    Diagnosis Date Resolved POA   No resolved problems to display.         Health Maintenance       Date Due Completion Date    Hepatitis C Screening 1945 ---    Lipid Panel 1945 ---    TETANUS VACCINE 9/11/1964 ---    Mammogram 9/11/1986 ---    DEXA SCAN 9/11/1986 ---    Colonoscopy 9/11/1996 ---    Zoster Vaccine 9/11/2006 ---    Pneumococcal (65+) (1 of 2 - PCV13) 9/11/2011 ---    Influenza Vaccine 8/1/2016 ---          Screening - no  History of polyps - yes  Diarrhea - no  Anemia - no  Blood in stools - no  Abdominal pain - no  Other - she has had constipation problems for a long time and had polyps on her last colonoscopy 2-3 years ago but it was an inadequate prep.     ROS:  CONSTITUTIONAL: Denies weight change,  fatigue, fevers, chills, night sweats.  CARDIOVASCULAR: Denies chest pain, shortness of breath, orthopnea and edema.  RESPIRATORY: Denies cough, hemoptysis, dyspnea, and wheezing.  GI: See HPI.    Medical History:   Past Medical History:   Diagnosis Date    Anemia     Periodic limb movement disorder        Surgical History:   Past Surgical History:   Procedure Laterality Date    APPENDECTOMY      bladder hydrodistinction       HYSTERECTOMY      TUBAL LIGATION         Family History:   Family History   Problem Relation Age of Onset    Heart disease Mother     Heart disease Father        Social History:   Social History   Substance Use Topics    Smoking status: Never Smoker     Smokeless tobacco: Never Used    Alcohol use Yes      Comment: 1 marguerita monthly       Allergies:   Review of patient's allergies indicates:   Allergen Reactions    Ceftin [cefuroxime axetil] Rash    Ciprofloxacin Rash    Morphine Rash    Pcn [penicillins] Rash       Medications:   No current facility-administered medications on file prior to encounter.      Current Outpatient Prescriptions on File Prior to Encounter   Medication Sig Dispense Refill    alprazolam (XANAX) 1 MG tablet TK 1 T PO BID  5    estradiol (ESTRACE) 1 MG tablet TK 1 T PO QD  5    furosemide (LASIX) 20 MG tablet TK 1 T PO QD  5    gabapentin (NEURONTIN) 100 MG capsule       ibandronate (BONIVA) 150 mg tablet TK 1 T PO Q MONTH  5    perphenazine-amitriptyline 2-10 mg (TRIAVIL 2-10) 2-10 mg Tab TK 1 T PO BID  5    hydrocodone-acetaminophen 7.5-325mg (NORCO) 7.5-325 mg per tablet TK 1 T PO Q 6 TO 8 H PRN P  0       Physical Exam:  Vital Signs:   Vitals:    03/14/17 0720   BP: (!) 153/75   Pulse: 76   Resp: 18   Temp: 97.6 °F (36.4 °C)     General Appearance: Well appearing in no acute distress  ENT: OP clear  Chest: CTA B  CV: RRR, no m/r/g  Abd: s/nt/nd/nabs  Ext: no edema    Labs:Reviewed    IMP:  Active Hospital Problems    Diagnosis  POA    *Altered bowel habits [R19.4]  Yes    History of colon polyps [Z86.010]  Not Applicable    Fecal incontinence alternating with constipation [R15.9, K59.00]  Yes      Resolved Hospital Problems    Diagnosis Date Resolved POA   No resolved problems to display.         Plan:   I have explained the risks and benefits of colonoscopy to the patient including but not limited to bleeding, perforation, infection, and death. The patient wishes to proceed.

## 2017-03-15 ENCOUNTER — SURGERY (OUTPATIENT)
Age: 72
End: 2017-03-15

## 2017-03-15 ENCOUNTER — ANESTHESIA EVENT (OUTPATIENT)
Dept: ENDOSCOPY | Facility: HOSPITAL | Age: 72
End: 2017-03-15
Payer: MEDICARE

## 2017-03-15 ENCOUNTER — ANESTHESIA (OUTPATIENT)
Dept: ENDOSCOPY | Facility: HOSPITAL | Age: 72
End: 2017-03-15
Payer: MEDICARE

## 2017-03-15 ENCOUNTER — HOSPITAL ENCOUNTER (OUTPATIENT)
Facility: HOSPITAL | Age: 72
Discharge: HOME | End: 2017-03-15
Attending: INTERNAL MEDICINE | Admitting: INTERNAL MEDICINE
Payer: MEDICARE

## 2017-03-15 VITALS
BODY MASS INDEX: 24.54 KG/M2 | DIASTOLIC BLOOD PRESSURE: 72 MMHG | HEART RATE: 82 BPM | TEMPERATURE: 98 F | OXYGEN SATURATION: 98 % | WEIGHT: 125 LBS | HEIGHT: 60 IN | SYSTOLIC BLOOD PRESSURE: 140 MMHG | RESPIRATION RATE: 14 BRPM | RESPIRATION RATE: 18 BRPM

## 2017-03-15 DIAGNOSIS — R19.8 CHANGE IN BOWEL FUNCTION: ICD-10-CM

## 2017-03-15 LAB — POCT GLUCOSE: 103 MG/DL (ref 70–110)

## 2017-03-15 PROCEDURE — 27201012 HC FORCEPS, HOT/COLD, DISP: Performed by: INTERNAL MEDICINE

## 2017-03-15 PROCEDURE — 37000008 HC ANESTHESIA 1ST 15 MINUTES: Performed by: INTERNAL MEDICINE

## 2017-03-15 PROCEDURE — 45380 COLONOSCOPY AND BIOPSY: CPT | Mod: 59,,, | Performed by: INTERNAL MEDICINE

## 2017-03-15 PROCEDURE — 88305 TISSUE EXAM BY PATHOLOGIST: CPT | Mod: 59 | Performed by: PATHOLOGY

## 2017-03-15 PROCEDURE — 25000003 PHARM REV CODE 250: Performed by: NURSE ANESTHETIST, CERTIFIED REGISTERED

## 2017-03-15 PROCEDURE — 45385 COLONOSCOPY W/LESION REMOVAL: CPT | Performed by: INTERNAL MEDICINE

## 2017-03-15 PROCEDURE — 88305 TISSUE EXAM BY PATHOLOGIST: CPT | Mod: 26,,, | Performed by: PATHOLOGY

## 2017-03-15 PROCEDURE — 82962 GLUCOSE BLOOD TEST: CPT | Performed by: INTERNAL MEDICINE

## 2017-03-15 PROCEDURE — 45385 COLONOSCOPY W/LESION REMOVAL: CPT | Mod: ,,, | Performed by: INTERNAL MEDICINE

## 2017-03-15 PROCEDURE — 63600175 PHARM REV CODE 636 W HCPCS: Performed by: NURSE ANESTHETIST, CERTIFIED REGISTERED

## 2017-03-15 PROCEDURE — 27201089 HC SNARE, DISP (ANY): Performed by: INTERNAL MEDICINE

## 2017-03-15 PROCEDURE — 25000003 PHARM REV CODE 250: Performed by: INTERNAL MEDICINE

## 2017-03-15 PROCEDURE — 37000009 HC ANESTHESIA EA ADD 15 MINS: Performed by: INTERNAL MEDICINE

## 2017-03-15 PROCEDURE — 45380 COLONOSCOPY AND BIOPSY: CPT | Performed by: INTERNAL MEDICINE

## 2017-03-15 RX ORDER — SODIUM CHLORIDE, SODIUM LACTATE, POTASSIUM CHLORIDE, CALCIUM CHLORIDE 600; 310; 30; 20 MG/100ML; MG/100ML; MG/100ML; MG/100ML
INJECTION, SOLUTION INTRAVENOUS CONTINUOUS PRN
Status: DISCONTINUED | OUTPATIENT
Start: 2017-03-15 | End: 2017-03-15

## 2017-03-15 RX ORDER — LIDOCAINE HCL/PF 100 MG/5ML
SYRINGE (ML) INTRAVENOUS
Status: DISCONTINUED | OUTPATIENT
Start: 2017-03-15 | End: 2017-03-15

## 2017-03-15 RX ORDER — PROPOFOL 10 MG/ML
INJECTION, EMULSION INTRAVENOUS
Status: DISCONTINUED | OUTPATIENT
Start: 2017-03-15 | End: 2017-03-15

## 2017-03-15 RX ORDER — SODIUM CHLORIDE, SODIUM LACTATE, POTASSIUM CHLORIDE, CALCIUM CHLORIDE 600; 310; 30; 20 MG/100ML; MG/100ML; MG/100ML; MG/100ML
INJECTION, SOLUTION INTRAVENOUS CONTINUOUS
Status: DISCONTINUED | OUTPATIENT
Start: 2017-03-15 | End: 2017-03-15 | Stop reason: HOSPADM

## 2017-03-15 RX ADMIN — SODIUM CHLORIDE, SODIUM LACTATE, POTASSIUM CHLORIDE, AND CALCIUM CHLORIDE: 600; 310; 30; 20 INJECTION, SOLUTION INTRAVENOUS at 01:03

## 2017-03-15 RX ADMIN — LIDOCAINE HYDROCHLORIDE 75 MG: 20 INJECTION, SOLUTION INTRAVENOUS at 12:03

## 2017-03-15 RX ADMIN — PROPOFOL 30 MG: 10 INJECTION, EMULSION INTRAVENOUS at 12:03

## 2017-03-15 RX ADMIN — PROPOFOL 30 MG: 10 INJECTION, EMULSION INTRAVENOUS at 01:03

## 2017-03-15 RX ADMIN — PROPOFOL 130 MG: 10 INJECTION, EMULSION INTRAVENOUS at 12:03

## 2017-03-15 RX ADMIN — SODIUM CHLORIDE, SODIUM LACTATE, POTASSIUM CHLORIDE, AND CALCIUM CHLORIDE: .6; .31; .03; .02 INJECTION, SOLUTION INTRAVENOUS at 10:03

## 2017-03-15 RX ADMIN — SODIUM CHLORIDE, SODIUM LACTATE, POTASSIUM CHLORIDE, AND CALCIUM CHLORIDE: 600; 310; 30; 20 INJECTION, SOLUTION INTRAVENOUS at 12:03

## 2017-03-15 NOTE — BRIEF OP NOTE
[facility]  Brief Operative Note     SUMMARY     Surgery Date: 3/15/2017     Surgeon(s) and Role:     * Jonathan Valverde MD - Primary    Assisting Surgeon: None    Pre-op Diagnosis:  History of colon polyps [Z86.010]  Altered bowel habits [R19.4]  Fecal incontinence alternating with constipation [R15.9, K59.00]    Post-op Diagnosis:  Post-Op Diagnosis Codes:     * History of colon polyps [Z86.010]     * Altered bowel habits [R19.4]     * Fecal incontinence alternating with constipation [R15.9, K59.00]    Procedure(s) (LRB):  COLONOSCOPY (N/A)    Anesthesia: Local MAC    Description of the findings of the procedure: Procedure completed. See Procedure note for details.     Findings/Key Components:  Procedure completed. See Procedure note for details.     Prosthetic/Devices: None    Estimated Blood Loss: None         Specimens:   Specimen (12h ago through future)    Start     Ordered    03/15/17 1243  Specimen to Pathology - Surgery  Once     Comments:  1. Cecal polyp x5  2. Ascending colon polyp x7    03/15/17 1308          Discharge Note    SUMMARY     Admit Date: 3/15/2017    Discharge Date and Time: 3/15/2017    Hospital Course (synopsis of major diagnoses, care, treatment, and services provided during the course of the hospital stay): Procedure completed. See Procedure note for details.     Final Diagnosis: Post-Op Diagnosis Codes:     * History of colon polyps [Z86.010]     * Altered bowel habits [R19.4]     * Fecal incontinence alternating with constipation [R15.9, K59.00]    Disposition: Discharge home when discharge criteria met.    Follow Up/Patient Instructions: With primary care physician as previously scheduled.    Medications:  Reconciled Home Medications: Current Discharge Medication List      CONTINUE these medications which have NOT CHANGED    Details   alprazolam (XANAX) 1 MG tablet TK 1 T PO BID  Refills: 5      estradiol (ESTRACE) 1 MG tablet TK 1 T PO QD  Refills: 5       furosemide (LASIX) 20 MG tablet TK 1 T PO QD  Refills: 5      gabapentin (NEURONTIN) 100 MG capsule       ibandronate (BONIVA) 150 mg tablet TK 1 T PO Q MONTH  Refills: 5      perphenazine-amitriptyline 2-10 mg (TRIAVIL 2-10) 2-10 mg Tab TK 1 T PO BID  Refills: 5      promethazine (PHENERGAN) 25 MG tablet Take 1 tablet (25 mg total) by mouth every 6 (six) hours as needed for Nausea.  Qty: 6 tablet, Refills: 0      hydrocodone-acetaminophen 7.5-325mg (NORCO) 7.5-325 mg per tablet TK 1 T PO Q 6 TO 8 H PRN P  Refills: 0         STOP taking these medications       sodium,potassium,mag sulfates (SUPREP BOWEL PREP KIT) 17.5-3.13-1.6 gram SolR Comments:   Reason for Stopping:               Discharge Procedure Orders  Diet general     Activity as tolerated       Follow-up Information     Follow up with Primary Doctor No.

## 2017-03-15 NOTE — ANESTHESIA PREPROCEDURE EVALUATION
03/15/2017  Macie Charles is a 71 y.o., female.    [facility] Anesthesia Evaluation    I have reviewed the Patient Summary Reports.    I have reviewed the Nursing Notes.   I have reviewed the Medications.     Review of Systems  Anesthesia Hx:  No problems with previous Anesthesia    Hematology/Oncology:         -- Anemia:   Endocrine:   Diabetes, well controlled, type 2    Psych:   Psychiatric History          Physical Exam  General:  Well nourished    Airway/Jaw/Neck:  Airway Findings: Mouth Opening: Normal Tongue: Normal  General Airway Assessment: Adult  Mallampati: II  TM Distance: Normal, at least 6 cm      Dental:  Dental Findings: In tact   Chest/Lungs:  Chest/Lungs Findings: Normal Respiratory Rate     Heart/Vascular:  Heart Findings: Rate: Normal  Rhythm: Regular Rhythm             Anesthesia Plan  Type of Anesthesia, risks & benefits discussed:  Anesthesia Type:  MAC  Patient's Preference:   Intra-op Monitoring Plan:   Intra-op Monitoring Plan Comments:   Post Op Pain Control Plan:   Post Op Pain Control Plan Comments:   Induction:   IV  Beta Blocker:  Patient is not currently on a Beta-Blocker (No further documentation required).       Informed Consent: Patient understands risks and agrees with Anesthesia plan.  Questions answered. Anesthesia consent signed with patient.  ASA Score: 2     Day of Surgery Review of History & Physical: I have interviewed and examined the patient. I have reviewed the patient's H&P dated:  There are no significant changes.          Ready For Surgery From Anesthesia Perspective.

## 2017-03-15 NOTE — TRANSFER OF CARE
Anesthesia Transfer of Care Note    Patient: Macie Charles    Procedure(s) Performed: Procedure(s) (LRB):  COLONOSCOPY (N/A)    Patient location: PACU    Anesthesia Type: MAC    Transport from OR: Transported from OR on room air with adequate spontaneous ventilation    Post pain: adequate analgesia    Post assessment: no apparent anesthetic complications    Post vital signs: stable    Level of consciousness: awake, alert and oriented    Nausea/Vomiting: no nausea/vomiting    Complications: none          Last vitals:   Visit Vitals    /82 (BP Location: Left arm, Patient Position: Lying, BP Method: Automatic)    Pulse 87    Temp 36.6 °C (97.9 °F) (Oral)    Resp 16    Ht 5' (1.524 m)    Wt 56.7 kg (125 lb)    SpO2 100%    Breastfeeding No    BMI 24.41 kg/m2

## 2017-03-15 NOTE — ANESTHESIA RELEASE NOTE
Anesthesia Release from PACU Note    Patient: Macie Charles    Procedure(s) Performed: Procedure(s) (LRB):  COLONOSCOPY (N/A)    Anesthesia type: MAC    Post pain: Adequate analgesia    Post assessment: no apparent anesthetic complications, tolerated procedure well and no evidence of recall    Last Vitals:   Visit Vitals    /82 (BP Location: Left arm, Patient Position: Lying, BP Method: Automatic)    Pulse 87    Temp 36.6 °C (97.9 °F) (Oral)    Resp 16    Ht 5' (1.524 m)    Wt 56.7 kg (125 lb)    SpO2 100%    Breastfeeding No    BMI 24.41 kg/m2       Post vital signs: stable    Level of consciousness: awake, alert  and oriented    Nausea/Vomiting: no nausea/no vomiting    Complications: none    Airway Patency: patent    Respiratory: unassisted, spontaneous ventilation, room air    Cardiovascular: stable    Hydration: euvolemic

## 2017-03-15 NOTE — IP AVS SNAPSHOT
[address] [place] [address]           Patient Discharge Instructions     Our goal is to set you up for success. This packet includes information on your condition, medications, and your home care. It will help you to care for yourself so you don't get sicker and need to go back to the hospital.     Please ask your nurse if you have any questions.        There are many details to remember when preparing to leave the hospital. Here is what you will need to do:    1. Take your medicine. If you are prescribed medications, review your Medication List in the following pages. You may have new medications to  at the pharmacy and others that you'll need to stop taking. Review the instructions for how and when to take your medications. Talk with your doctor or nurses if you are unsure of what to do.     2. Go to your follow-up appointments. Specific follow-up information is listed in the following pages. Your may be contacted by a transition nurse or clinical provider about future appointments. Be sure we have all of the phone numbers to reach you, if needed. Please contact your provider's office if you are unable to make an appointment.     3. Watch for warning signs. Your doctor or nurse will give you detailed warning signs to watch for and when to call for assistance. These instructions may also include educational information about your condition. If you experience any of warning signs to your health, call your doctor.               ** Verify the list of medication(s) below is accurate and up to date. Carry this with you in case of emergency. If your medications have changed, please notify your healthcare provider.             Medication List      CONTINUE taking these medications        Additional Info                      alprazolam 1 MG tablet   Commonly known as:  XANAX   Refills:  5    Instructions:  TK 1 T PO BID     Begin Date    AM    Noon    PM    Bedtime        estradiol 1 MG tablet   Commonly known as:  ESTRACE   Refills:  5    Instructions:  TK 1 T PO QD     Begin Date    AM    Noon    PM    Bedtime       furosemide 20 MG tablet   Commonly known as:  LASIX   Refills:  5    Instructions:  TK 1 T PO QD     Begin Date    AM    Noon    PM    Bedtime       gabapentin 100 MG capsule   Commonly known as:  NEURONTIN   Refills:  0      Begin Date    AM    Noon    PM    Bedtime       hydrocodone-acetaminophen 7.5-325mg 7.5-325 mg per tablet   Commonly known as:  NORCO   Refills:  0    Instructions:  TK 1 T PO Q 6 TO 8 H PRN P     Begin Date    AM    Noon    PM    Bedtime       ibandronate 150 mg tablet   Commonly known as:  BONIVA   Refills:  5    Instructions:  TK 1 T PO Q MONTH     Begin Date    AM    Noon    PM    Bedtime       perphenazine-amitriptyline 2-10 mg 2-10 mg Tab   Commonly known as:  TRIAVIL 2-10   Refills:  5    Instructions:  TK 1 T PO BID     Begin Date    AM    Noon    PM    Bedtime       promethazine 25 MG tablet   Commonly known as:  PHENERGAN   Quantity:  6 tablet   Refills:  0   Dose:  25 mg    Instructions:  Take 1 tablet (25 mg total) by mouth every 6 (six) hours as needed for Nausea.     Begin Date    AM    Noon    PM    Bedtime         STOP taking these medications     sodium,potassium,mag sulfates 17.5-3.13-1.6 gram Solr   Commonly known as:  SUPREP BOWEL PREP KIT                  Please bring to all follow up appointments:    1. A copy of your discharge instructions.  2. All medicines you are currently taking in their original bottles.  3. Identification and insurance card.    Please arrive 15 minutes ahead of scheduled appointment time.    Please call 24 hours in advance if you must reschedule your appointment and/or time.        Your Scheduled Appointments     Apr 20, 2017 10:40 AM CDT   GASTROENTEROLOGY ESTABLISHED PATIENT with JENARO Hernandez   [facility] ([facility])    [address] [facility] [place] [id number] [phone]               Follow-up Information     Follow up with Primary Doctor No.        Discharge Instructions     Future Orders    Activity as tolerated     Diet general     Questions:    Total calories:      Fat restriction, if any:      Protein restriction, if any:      Na restriction, if any:      Fluid restriction:      Additional restrictions:          Discharge Instructions         Understanding Colon and Rectal Polyps    The colon (also called the large intestine) is a muscular tube that forms the last part of the digestive tract. It absorbs water and stores food waste. The colon is about 4 to 6 feet long. The rectum is the last 6 inches of the colon. The colon and rectum have a smooth lining composed of millions of cells. Changes in these cells can lead to growths in the colon that can become cancerous and should be removed. Multiple tests are available to screen for colon cancer, but the colonoscopy is the most recommended test. During colonoscopy, these polyps can be removed. How often you need this test depends on many things including your condition, your family history, symptoms, and what the findings were at the previous colonoscopy.   When the colon lining changes  Changes that happen in the cells that line the colon or rectum can lead to growths called polyps. Over a period of years, polyps can turn cancerous. Removing polyps early may prevent cancer from ever forming.  Polyps  Polyps are fleshy clumps of tissue that form on the lining of the colon or rectum. Small polyps are usually benign (not cancerous). However, over time, cells in a polyp can change and become cancerous. Certain types of polyps known as adenomatous polyps are premalignant. The risk for invasive cancer increases with the size of the polyp and certain cell and gene features. This means that they can become cancerous if they're not removed. Hyperplastic polyps are benign. They can grow quite large and not turn cancerous.   Cancer  Almost all  colorectal cancers start when polyp cells begin growing abnormally. As a cancerous tumor grows, it may involve more and more of the colon or rectum. In time, cancer can also grow beyond the colon or rectum and spread to nearby organs or to glands called lymph nodes. The cells can also travel to other parts of the body. This is known as metastasis. The earlier a cancerous tumor is removed, the better the chance of preventing its spread.    Date Last Reviewed: 8/1/2016  © 6886-4596 [employer]. [address] [place] [address]. All rights reserved. This information is not intended as a substitute for professional medical care. Always follow your healthcare professional's instructions.            Admission Information     Date & Time Provider Department CSN    3/15/2017  9:23 AM Jonathan Valverde MD [facility] [id number]      Care Providers     Provider Role Specialty Primary office phone    Jonathan Valverde MD Attending Provider Gastroenterology [phone]    Jonathan Valverde MD Surgeon  Gastroenterology [phone]      Your Vitals Were     BP Pulse Temp Resp Height Weight    112/82 (BP Location: Left arm, Patient Position: Lying, BP Method: Automatic) 87 97.9 °F (36.6 °C) (Oral) 16 5' (1.524 m) 56.7 kg (125 lb)    SpO2 BMI             100% 24.41 kg/m2         Recent Lab Values        11/15/2016                           7:26 AM           A1C 5.2           Comment for A1C at  7:26 AM on 11/15/2016:  According to ADA guidelines, hemoglobin A1C <7.0% represents  optimal control in non-pregnant diabetic patients.  Different  metrics may apply to specific populations.   Standards of Medical Care in Diabetes - 2016.  For the purpose of screening for the presence of diabetes:  <5.7%     Consistent with the absence of diabetes  5.7-6.4%  Consistent with increasing risk for diabetes   (prediabetes)  >or=6.5%  Consistent with diabetes  Currently no consensus exists for use  of hemoglobin A1C  for diagnosis of diabetes for children.        Pending Labs     Order Current Status    Specimen to Pathology - Surgery Collected (03/15/17 1308)      Allergies as of 3/15/2017        Reactions    Ceftin [Cefuroxime Axetil] Rash    Ciprofloxacin Rash    Morphine Rash    Pcn [Penicillins] Rash      [facility] On Call     [facility] Care [facility] - 24/7 Assistance  Unless otherwise directed by your provider, please contact [facility], our nurse care line that is available for 24/7 assistance.     Registered nurses in the [facility] provide clinical advisement, health education, appointment booking, and other advisory services.  Call for this free service at [phone].        Advance Directives     An advance directive is a document which, in the event you are no longer able to make decisions for yourself, tells your healthcare team what kind of treatment you do or do not want to receive, or who you would like to make those decisions for you.  If you do not currently have an advance directive, [facility] encourages you to create one.  For more information call:  [phone] ([phone]),  or log on to [email or web address].        Language Assistance Services     ATTENTION: Language assistance services are available, free of charge. Please call [phone].      ATENCIÓN: Si habla español, tiene a castro disposición servicios gratuitos de asistencia lingüística. Llame al [phone].     CHÚ Ý: N?u b?n nói Ti?ng Vi?t, có các d?ch v? h? tr? ngôn ng? mi?n phí dành cho b?n. G?i s? [phone].         [facility]  complies with applicable Federal civil rights laws and does not discriminate on the basis of race, color, national origin, age, disability, or sex.

## 2017-03-15 NOTE — ANESTHESIA POSTPROCEDURE EVALUATION
Anesthesia Post Evaluation    Patient: Macie Charles    Procedure(s) Performed: Procedure(s) (LRB):  COLONOSCOPY (N/A)    Final Anesthesia Type: MAC  Patient location during evaluation: PACU  Patient participation: Yes- Able to Participate  Level of consciousness: awake and alert and oriented  Post-procedure vital signs: reviewed and stable  Pain management: adequate  Airway patency: patent  PONV status at discharge: No PONV  Anesthetic complications: no      Cardiovascular status: blood pressure returned to baseline  Respiratory status: unassisted, room air and spontaneous ventilation  Hydration status: euvolemic  Follow-up not needed.        Visit Vitals    /82 (BP Location: Left arm, Patient Position: Lying, BP Method: Automatic)    Pulse 87    Temp 36.6 °C (97.9 °F) (Oral)    Resp 16    Ht 5' (1.524 m)    Wt 56.7 kg (125 lb)    SpO2 100%    Breastfeeding No    BMI 24.41 kg/m2       Pain/Alessio Score: Pain Assessment Performed: Yes (3/15/2017  1:15 PM)  Presence of Pain: denies (3/15/2017  1:15 PM)  Alessio Score: 10 (3/15/2017  1:15 PM)

## 2017-03-15 NOTE — INTERVAL H&P NOTE
The patient has been examined and the H&P has been reviewed:    I concur with the findings and no changes have occurred since H&P was written.    Anesthesia/Surgery risks, benefits and alternative options discussed and understood by patient/family.          Active Hospital Problems    Diagnosis  POA    Change in bowel function [R19.4]  Yes      Resolved Hospital Problems    Diagnosis Date Resolved POA   No resolved problems to display.

## 2017-03-15 NOTE — DISCHARGE INSTRUCTIONS

## 2017-03-22 ENCOUNTER — TELEPHONE (OUTPATIENT)
Dept: GASTROENTEROLOGY | Facility: CLINIC | Age: 72
End: 2017-03-22

## 2017-03-22 DIAGNOSIS — K63.5 POLYP OF COLON, UNSPECIFIED PART OF COLON, UNSPECIFIED TYPE: Primary | ICD-10-CM

## 2017-03-22 DIAGNOSIS — D12.2 ADENOMATOUS POLYP OF ASCENDING COLON: ICD-10-CM

## 2017-03-22 NOTE — TELEPHONE ENCOUNTER
Returned call to pt who stated she has decided to give Linzess a little more time to see if it helps her. Pt would also like to know if a probiotic called VSL #3 would be good for her to take. Pt is also requesting pathology results from 3/15/17

## 2017-03-22 NOTE — TELEPHONE ENCOUNTER
----- Message from Елена Goncalves sent at 3/22/2017 12:15 PM CDT -----  Contact: Patient  Patient has a question regarding Linzess, stating it does not work, please call her back at [phone]. Thank you

## 2017-03-23 ENCOUNTER — TELEPHONE (OUTPATIENT)
Dept: GASTROENTEROLOGY | Facility: CLINIC | Age: 72
End: 2017-03-23

## 2017-03-23 RX ORDER — SODIUM, POTASSIUM,MAG SULFATES 17.5-3.13G
2 SOLUTION, RECONSTITUTED, ORAL ORAL ONCE
Qty: 1 BOTTLE | Refills: 0 | Status: SHIPPED | OUTPATIENT
Start: 2017-03-23 | End: 2017-03-23

## 2017-03-23 NOTE — TELEPHONE ENCOUNTER
----- Message from Irina Gasca sent at 3/23/2017  3:24 PM CDT -----  Contact: Karen/Thong  Robbie called to speak with the nurse to find out if she should give the patient 1 kit. She can be contacted at [phone].    [facility] - [place], [place] - [address] AT [address]  [address] [place] [address]  Phone: [phone] Fax: [phone]    Thanks,  Irina

## 2017-03-23 NOTE — TELEPHONE ENCOUNTER
Called and discussed path results with patient.     All polyps were adenomas. There are multiple small polyps, carpet like in the cecum and ascending colon.   Given the number and extent advised to discuss right hemicolectomy with surgeon.     Will schedule for repeat colonoscopy as well (patient prefers it after steven). Two days of Suprep prescribed

## 2017-03-24 ENCOUNTER — PATIENT MESSAGE (OUTPATIENT)
Dept: GASTROENTEROLOGY | Facility: CLINIC | Age: 72
End: 2017-03-24

## 2017-03-27 RX ORDER — SODIUM, POTASSIUM,MAG SULFATES 17.5-3.13G
SOLUTION, RECONSTITUTED, ORAL ORAL
Qty: 354 ML | Refills: 0 | Status: ON HOLD | OUTPATIENT
Start: 2017-03-27 | End: 2017-04-25 | Stop reason: HOSPADM

## 2017-04-25 ENCOUNTER — TELEPHONE (OUTPATIENT)
Dept: GASTROENTEROLOGY | Facility: CLINIC | Age: 72
End: 2017-04-25

## 2017-04-25 ENCOUNTER — ANESTHESIA EVENT (OUTPATIENT)
Dept: ENDOSCOPY | Facility: HOSPITAL | Age: 72
End: 2017-04-25
Payer: MEDICARE

## 2017-04-25 ENCOUNTER — HOSPITAL ENCOUNTER (OUTPATIENT)
Facility: HOSPITAL | Age: 72
Discharge: HOME | End: 2017-04-25
Attending: INTERNAL MEDICINE | Admitting: INTERNAL MEDICINE
Payer: MEDICARE

## 2017-04-25 ENCOUNTER — ANESTHESIA (OUTPATIENT)
Dept: ENDOSCOPY | Facility: HOSPITAL | Age: 72
End: 2017-04-25
Payer: MEDICARE

## 2017-04-25 ENCOUNTER — SURGERY (OUTPATIENT)
Age: 72
End: 2017-04-25

## 2017-04-25 VITALS
HEIGHT: 60 IN | OXYGEN SATURATION: 100 % | RESPIRATION RATE: 19 BRPM | WEIGHT: 122 LBS | SYSTOLIC BLOOD PRESSURE: 138 MMHG | TEMPERATURE: 98 F | BODY MASS INDEX: 23.95 KG/M2 | DIASTOLIC BLOOD PRESSURE: 78 MMHG | RESPIRATION RATE: 16 BRPM | HEART RATE: 76 BPM

## 2017-04-25 DIAGNOSIS — K63.5 COLON POLYPS: ICD-10-CM

## 2017-04-25 LAB — POCT GLUCOSE: 111 MG/DL (ref 70–110)

## 2017-04-25 PROCEDURE — 88305 TISSUE EXAM BY PATHOLOGIST: CPT | Performed by: PATHOLOGY

## 2017-04-25 PROCEDURE — 45380 COLONOSCOPY AND BIOPSY: CPT | Performed by: INTERNAL MEDICINE

## 2017-04-25 PROCEDURE — 27201012 HC FORCEPS, HOT/COLD, DISP: Performed by: INTERNAL MEDICINE

## 2017-04-25 PROCEDURE — 25000003 PHARM REV CODE 250: Performed by: INTERNAL MEDICINE

## 2017-04-25 PROCEDURE — 88305 TISSUE EXAM BY PATHOLOGIST: CPT | Mod: 26,,, | Performed by: PATHOLOGY

## 2017-04-25 PROCEDURE — 63600175 PHARM REV CODE 636 W HCPCS: Performed by: NURSE ANESTHETIST, CERTIFIED REGISTERED

## 2017-04-25 PROCEDURE — 25000003 PHARM REV CODE 250: Performed by: NURSE ANESTHETIST, CERTIFIED REGISTERED

## 2017-04-25 PROCEDURE — 37000008 HC ANESTHESIA 1ST 15 MINUTES: Performed by: INTERNAL MEDICINE

## 2017-04-25 PROCEDURE — 45380 COLONOSCOPY AND BIOPSY: CPT | Mod: PT,,, | Performed by: INTERNAL MEDICINE

## 2017-04-25 PROCEDURE — 82962 GLUCOSE BLOOD TEST: CPT | Performed by: INTERNAL MEDICINE

## 2017-04-25 PROCEDURE — 37000009 HC ANESTHESIA EA ADD 15 MINS: Performed by: INTERNAL MEDICINE

## 2017-04-25 RX ORDER — DOXEPIN HYDROCHLORIDE 10 MG/1
10 CAPSULE ORAL NIGHTLY
Status: ON HOLD | COMMUNITY
End: 2018-05-03

## 2017-04-25 RX ORDER — PROPOFOL 10 MG/ML
INJECTION, EMULSION INTRAVENOUS
Status: DISCONTINUED | OUTPATIENT
Start: 2017-04-25 | End: 2017-04-25

## 2017-04-25 RX ORDER — SODIUM CHLORIDE, SODIUM LACTATE, POTASSIUM CHLORIDE, CALCIUM CHLORIDE 600; 310; 30; 20 MG/100ML; MG/100ML; MG/100ML; MG/100ML
INJECTION, SOLUTION INTRAVENOUS CONTINUOUS
Status: DISCONTINUED | OUTPATIENT
Start: 2017-04-25 | End: 2017-04-25 | Stop reason: HOSPADM

## 2017-04-25 RX ORDER — SODIUM CHLORIDE, SODIUM LACTATE, POTASSIUM CHLORIDE, CALCIUM CHLORIDE 600; 310; 30; 20 MG/100ML; MG/100ML; MG/100ML; MG/100ML
INJECTION, SOLUTION INTRAVENOUS CONTINUOUS PRN
Status: DISCONTINUED | OUTPATIENT
Start: 2017-04-25 | End: 2017-04-25

## 2017-04-25 RX ORDER — LIDOCAINE HCL/PF 100 MG/5ML
SYRINGE (ML) INTRAVENOUS
Status: DISCONTINUED | OUTPATIENT
Start: 2017-04-25 | End: 2017-04-25

## 2017-04-25 RX ADMIN — PROPOFOL 120 MG: 10 INJECTION, EMULSION INTRAVENOUS at 09:04

## 2017-04-25 RX ADMIN — PROPOFOL 30 MG: 10 INJECTION, EMULSION INTRAVENOUS at 09:04

## 2017-04-25 RX ADMIN — PROPOFOL 30 MG: 10 INJECTION, EMULSION INTRAVENOUS at 10:04

## 2017-04-25 RX ADMIN — LIDOCAINE HYDROCHLORIDE 80 MG: 20 INJECTION, SOLUTION INTRAVENOUS at 09:04

## 2017-04-25 RX ADMIN — SODIUM CHLORIDE, SODIUM LACTATE, POTASSIUM CHLORIDE, AND CALCIUM CHLORIDE: 600; 310; 30; 20 INJECTION, SOLUTION INTRAVENOUS at 09:04

## 2017-04-25 RX ADMIN — SODIUM CHLORIDE, SODIUM LACTATE, POTASSIUM CHLORIDE, AND CALCIUM CHLORIDE: .6; .31; .03; .02 INJECTION, SOLUTION INTRAVENOUS at 09:04

## 2017-04-25 NOTE — TRANSFER OF CARE
Anesthesia Transfer of Care Note    Patient: Macie Charles    Procedure(s) Performed: Procedure(s) (LRB):  COLONOSCOPY (N/A)    Patient location: PACU    Anesthesia Type: MAC    Transport from OR: Transported from OR on room air with adequate spontaneous ventilation    Post pain: adequate analgesia    Post assessment: no apparent anesthetic complications    Post vital signs: stable    Level of consciousness: sedated    Nausea/Vomiting: no nausea/vomiting    Complications: none          Last vitals:   Visit Vitals    /69 (BP Location: Left arm, Patient Position: Lying, BP Method: Automatic)    Pulse 76    Temp 36.5 °C (97.7 °F)    Resp 16    Ht 5' (1.524 m)    Wt 55.3 kg (122 lb)    SpO2 99%    Breastfeeding No    BMI 23.83 kg/m2

## 2017-04-25 NOTE — TELEPHONE ENCOUNTER
----- Message from Jonathan Valverde MD sent at 4/25/2017 10:16 AM CDT -----  Please schedule consult with Dr. Robles in surgery to discuss right hemicolectomy

## 2017-04-25 NOTE — IP AVS SNAPSHOT
[address] [place] [address]           Patient Discharge Instructions   Our goal is to set you up for success. This packet includes information on your condition, medications, and your home care.  It will help you care for yourself to prevent having to return to the hospital.     Please ask your nurse if you have any questions.      There are many details to remember when preparing to leave the hospital. Here is what you will need to do:    1. Take your medicine. If you are prescribed medications, review your Medication List on the following pages. You may have new medications to  at the pharmacy and others that you'll need to stop taking. Review the instructions for how and when to take your medications. Talk with your doctor or nurses if you are unsure of what to do.     2. Go to your follow-up appointments. Specific follow-up information is listed in the following pages. Your may be contacted by a nurse or clinical provider about future appointments. Be sure we have all of the phone numbers to reach you. Please contact your provider's office if you are unable to make an appointment.     3. Watch for warning signs. Your doctor or nurse will give you detailed warning signs to watch for and when to call for assistance. These instructions may also include educational information about your condition. If you experience any of warning signs to your health, call your doctor.               ** Verify the list of medication(s) below is accurate and up to date. Carry this with you in case of emergency. If your medications have changed, please notify your healthcare provider.             Medication List      CONTINUE taking these medications        Additional Info                      alprazolam 1 MG tablet   Commonly known as:  XANAX   Refills:  5    Instructions:  TK 1 T PO BID     Begin Date    AM    Noon    PM    Bedtime       doxepin 10 MG capsule   Commonly  known as:  SINEQUAN   Refills:  0   Dose:  10 mg    Instructions:  Take 10 mg by mouth every evening.     Begin Date    AM    Noon    PM    Bedtime       estradiol 1 MG tablet   Commonly known as:  ESTRACE   Refills:  5    Instructions:  TK 1 T PO QD     Begin Date    AM    Noon    PM    Bedtime       furosemide 20 MG tablet   Commonly known as:  LASIX   Refills:  5    Instructions:  TK 1 T PO QD     Begin Date    AM    Noon    PM    Bedtime       gabapentin 100 MG capsule   Commonly known as:  NEURONTIN   Refills:  0      Begin Date    AM    Noon    PM    Bedtime       hydrocodone-acetaminophen 7.5-325mg 7.5-325 mg per tablet   Commonly known as:  NORCO   Refills:  0    Instructions:  TK 1 T PO Q 6 TO 8 H PRN P     Begin Date    AM    Noon    PM    Bedtime       ibandronate 150 mg tablet   Commonly known as:  BONIVA   Refills:  5    Instructions:  TK 1 T PO Q MONTH     Begin Date    AM    Noon    PM    Bedtime       linaclotide 145 mcg Cap capsule   Commonly known as:  LINZESS   Quantity:  30 capsule   Refills:  2   Dose:  145 mcg    Instructions:  Take 1 capsule (145 mcg total) by mouth once daily.     Begin Date    AM    Noon    PM    Bedtime       perphenazine-amitriptyline 2-10 mg 2-10 mg Tab   Commonly known as:  TRIAVIL 2-10   Refills:  5    Instructions:  TK 1 T PO BID     Begin Date    AM    Noon    PM    Bedtime       promethazine 25 MG tablet   Commonly known as:  PHENERGAN   Quantity:  6 tablet   Refills:  0   Dose:  25 mg    Instructions:  Take 1 tablet (25 mg total) by mouth every 6 (six) hours as needed for Nausea.     Begin Date    AM    Noon    PM    Bedtime         STOP taking these medications     sodium,potassium,mag sulfates 17.5-3.13-1.6 gram Solr   Commonly known as:  SUPREP BOWEL PREP KIT                  Please bring to all follow up appointments:    1. A copy of your discharge instructions.  2. All medicines you are currently taking in their original bottles.  3. Identification and  insurance card.    Please arrive 15 minutes ahead of scheduled appointment time.    Please call 24 hours in advance if you must reschedule your appointment and/or time.        Follow-up Information     Follow up with Arturo Camacho DO.    Specialty:  Family Medicine    Contact information:    [address] [facility]  [address] [place] [address] [phone]        Referrals     Future Orders    Ambulatory consult to General Surgery         Discharge Instructions     Future Orders    Activity as tolerated     Diet general     Questions:    Total calories:      Fat restriction, if any:      Protein restriction, if any:      Na restriction, if any:      Fluid restriction:      Additional restrictions:          Discharge Instructions         Understanding Colon and Rectal Polyps    The colon (also called the large intestine) is a muscular tube that forms the last part of the digestive tract. It absorbs water and stores food waste. The colon is about 4 to 6 feet long. The rectum is the last 6 inches of the colon. The colon and rectum have a smooth lining composed of millions of cells. Changes in these cells can lead to growths in the colon that can become cancerous and should be removed. Multiple tests are available to screen for colon cancer, but the colonoscopy is the most recommended test. During colonoscopy, these polyps can be removed. How often you need this test depends on many things including your condition, your family history, symptoms, and what the findings were at the previous colonoscopy.   When the colon lining changes  Changes that happen in the cells that line the colon or rectum can lead to growths called polyps. Over a period of years, polyps can turn cancerous. Removing polyps early may prevent cancer from ever forming.  Polyps  Polyps are fleshy clumps of tissue that form on the lining of the colon or rectum. Small polyps are usually benign (not cancerous). However, over time, cells in a polyp can  change and become cancerous. Certain types of polyps known as adenomatous polyps are premalignant. The risk for invasive cancer increases with the size of the polyp and certain cell and gene features. This means that they can become cancerous if they're not removed. Hyperplastic polyps are benign. They can grow quite large and not turn cancerous.   Cancer  Almost all colorectal cancers start when polyp cells begin growing abnormally. As a cancerous tumor grows, it may involve more and more of the colon or rectum. In time, cancer can also grow beyond the colon or rectum and spread to nearby organs or to glands called lymph nodes. The cells can also travel to other parts of the body. This is known as metastasis. The earlier a cancerous tumor is removed, the better the chance of preventing its spread.    Date Last Reviewed: 8/1/2016  © 4725-9681 [employer]. [address] [place] [address]. All rights reserved. This information is not intended as a substitute for professional medical care. Always follow your healthcare professional's instructions.            Admission Information     Date & Time Provider Department CSN    4/25/2017  7:52 AM Jonathan Valverde MD [facility] -  [id number]      Care Providers     Provider Role Specialty Primary office phone    Jonathan Valverde MD Attending Provider Gastroenterology [phone]    Jonathan Valverde MD Surgeon  Gastroenterology [phone]      Your Vitals Were     BP Pulse Temp Resp Height Weight    117/69 (BP Location: Left arm, Patient Position: Lying, BP Method: Automatic) 76 97.7 °F (36.5 °C) 16 5' (1.524 m) 55.3 kg (122 lb)    SpO2 BMI             99% 23.83 kg/m2         Recent Lab Values        11/15/2016                           7:26 AM           A1C 5.2           Comment for A1C at  7:26 AM on 11/15/2016:  According to ADA guidelines, hemoglobin A1C <7.0% represents  optimal control in non-pregnant diabetic patients.   Different  metrics may apply to specific populations.   Standards of Medical Care in Diabetes - 2016.  For the purpose of screening for the presence of diabetes:  <5.7%     Consistent with the absence of diabetes  5.7-6.4%  Consistent with increasing risk for diabetes   (prediabetes)  >or=6.5%  Consistent with diabetes  Currently no consensus exists for use of hemoglobin A1C  for diagnosis of diabetes for children.        Pending Labs     Order Current Status    Specimen to Pathology - Surgery Collected (04/25/17 1010)      Allergies as of 4/25/2017        Reactions    Ceftin [Cefuroxime Axetil] Rash    Ciprofloxacin Rash    Morphine Rash    Pcn [Penicillins] Rash      [facility] On Call     [facility] Nurse Care Line - 24/7 Assistance  Unless otherwise directed by your provider, please contact [facility], our nurse care line that is available for 24/7 assistance.     Registered nurses in the [facility] provide clinical advisement, health education, appointment booking, and other advisory services.  Call for this free service at [phone].        Advance Directives     An advance directive is a document which, in the event you are no longer able to make decisions for yourself, tells your healthcare team what kind of treatment you do or do not want to receive, or who you would like to make those decisions for you.  If you do not currently have an advance directive, [facility] encourages you to create one.  For more information call:  [phone] ([phone]),  or log on to [email or web address].        Language Assistance Services     ATTENTION: Language assistance services are available, free of charge. Please call [phone].      ATENCIÓN: Si habla español, tiene a castro disposición servicios gratuitos de asistencia lingüística. Llame al [phone].     CHÚ Ý: N?u b?n nói Ti?ng Vi?t, có các d?ch v? h? tr? ngôn ng? mi?n phí dành cho b?n. G?i s?  [phone].         [facility] complies with applicable Federal civil rights laws and does not discriminate on the basis of race, color, national origin, age, disability, or sex.

## 2017-04-25 NOTE — H&P
Short Stay Endoscopy History and Physical    PCP - Arturo Camacho, DO    Procedure - Colonoscopy  ASA - II  Mallampati - per anesthesia  History of Anesthesia problems - no  Family history Anesthesia problems -  no     HPI:  This is a 71 y.o. female here for evaluation of :  Colon polyps    Average Risk Screening:no  Family history of colon cancer: No  History of polyps: yes  Anemia: No  Blood in stools: No  Diarrhea: No  Abdominal Pain: No    Review of Systems:  CONSTITUTIONAL: Denies weight change,  fatigue, fevers, chills, night sweats.  CARDIOVASCULAR: Denies chest pain, shortness of breath, orthopnea and edema.  RESPIRATORY: Denies cough, hemoptysis, dyspnea, and wheezing.  GI: See HPI.    Medical History:  Past Medical History:   Diagnosis Date    Anemia     Diabetes mellitus     Periodic limb movement disorder        Surgical History:   Past Surgical History:   Procedure Laterality Date    APPENDECTOMY      bladder hydrodistinction       COLONOSCOPY N/A 3/15/2017    Procedure: COLONOSCOPY;  Surgeon: Jonathan Valverde MD;  Location: [facility];  Service: Endoscopy;  Laterality: N/A;    COLONOSCOPY N/A 3/14/2017    Procedure: COLONOSCOPY;  Surgeon: Eder Calvo MD;  Location: [facility];  Service: Endoscopy;  Laterality: N/A;    HYSTERECTOMY      TUBAL LIGATION         Family History:   Family History   Problem Relation Age of Onset    Heart disease Mother     Heart disease Father        Social History:   Social History   Substance Use Topics    Smoking status: Never Smoker    Smokeless tobacco: Never Used    Alcohol use Yes      Comment: 1 marguerita monthly       Allergies: Reviewed.    Medications:  No current facility-administered medications on file prior to encounter.      Current Outpatient Prescriptions on File Prior to Encounter   Medication Sig Dispense Refill    alprazolam (XANAX) 1 MG tablet TK 1 T PO BID  5    estradiol (ESTRACE) 1 MG tablet TK 1 T PO QD  5     furosemide (LASIX) 20 MG tablet TK 1 T PO QD  5    gabapentin (NEURONTIN) 100 MG capsule       ibandronate (BONIVA) 150 mg tablet TK 1 T PO Q MONTH  5    linaclotide (LINZESS) 145 mcg Cap capsule Take 1 capsule (145 mcg total) by mouth once daily. 30 capsule 2    perphenazine-amitriptyline 2-10 mg (TRIAVIL 2-10) 2-10 mg Tab TK 1 T PO BID  5    promethazine (PHENERGAN) 25 MG tablet Take 1 tablet (25 mg total) by mouth every 6 (six) hours as needed for Nausea. 6 tablet 0    hydrocodone-acetaminophen 7.5-325mg (NORCO) 7.5-325 mg per tablet TK 1 T PO Q 6 TO 8 H PRN P  0       Physical Exam:  Vital Signs:   Vitals:    04/25/17 0854   BP: 134/71   Pulse: 72   Resp: 18   Temp: 97.7 °F (36.5 °C)     General Appearance: Well appearing in no acute distress  ENT: OP clear  Chest: CTA B  CV: RRR, no m/r/g  Abd: s/nt/nd/nabs  Ext: no edema    Labs:  Lab Results   Component Value Date    WBC 4.74 11/15/2016    HGB 12.0 11/15/2016    HCT 36.1 (L) 11/15/2016    MCV 91 11/15/2016     11/15/2016     No results found for: INR, PROTIME  Lab Results   Component Value Date    IRON 68 11/15/2016    TIBC 348 11/15/2016    FERRITIN 120 11/15/2016         IMPRESSION:  Patient Active Problem List   Diagnosis    Chronic disease anemia    Monoclonal paraproteinemia    Depression    Night sweats    History of colon polyps    Altered bowel habits    Fecal incontinence alternating with constipation    Change in bowel function    Colon polyps       Plan:  I have explained the risks and benefits of colonoscopy to the patient including but not limited to bleeding, perforation, infection, and death. The patient wishes to proceed with colonoscopy.

## 2017-04-25 NOTE — ANESTHESIA RELEASE NOTE
Anesthesia Release from PACU Note    Patient: Macie Charles    Procedure(s) Performed: Procedure(s) (LRB):  COLONOSCOPY (N/A)    Anesthesia type: MAC    Post pain: Adequate analgesia    Post assessment: no apparent anesthetic complications, tolerated procedure well and no evidence of recall    Last Vitals:   Visit Vitals    /69 (BP Location: Left arm, Patient Position: Lying, BP Method: Automatic)    Pulse 76    Temp 36.5 °C (97.7 °F)    Resp 16    Ht 5' (1.524 m)    Wt 55.3 kg (122 lb)    SpO2 99%    Breastfeeding No    BMI 23.83 kg/m2       Post vital signs: stable    Level of consciousness: awake and alert     Nausea/Vomiting: no nausea/no vomiting    Complications: none    Airway Patency: patent    Respiratory: unassisted, spontaneous ventilation, room air    Cardiovascular: stable    Hydration: euvolemic

## 2017-04-25 NOTE — DISCHARGE INSTRUCTIONS

## 2017-04-25 NOTE — ANESTHESIA PREPROCEDURE EVALUATION
04/25/2017  Macie Charles is a 71 y.o., female.    Anesthesia Evaluation    I have reviewed the Patient Summary Reports.    I have reviewed the Nursing Notes.   I have reviewed the Medications.     Review of Systems  Anesthesia Hx:  No problems with previous Anesthesia    Social:  Non-Smoker, Social Alcohol Use    Hematology/Oncology:     Oncology Normal    -- Anemia:   EENT/Dental:EENT/Dental Normal   Cardiovascular:   hyperlipidemia    Pulmonary:  Pulmonary Normal Snore   Renal/:  Renal/ Normal     Hepatic/GI:   Bowel Prep. GERD, well controlled 0200 last drink of fluid.   Neurological:  Neurology Normal    Endocrine:   Diabetes, type 2    Psych:   Psychiatric History          Physical Exam  General:  Well nourished    Airway/Jaw/Neck:  Airway Findings: Mallampati: II                Anesthesia Plan  Type of Anesthesia, risks & benefits discussed:  Anesthesia Type:  MAC  Patient's Preference:   Intra-op Monitoring Plan:   Intra-op Monitoring Plan Comments:   Post Op Pain Control Plan:   Post Op Pain Control Plan Comments:   Induction:   IV  Beta Blocker:  Patient is not currently on a Beta-Blocker (No further documentation required).       Informed Consent: Patient understands risks and agrees with Anesthesia plan.  Questions answered. Anesthesia consent signed with patient.  ASA Score: 3     Day of Surgery Review of History & Physical: I have interviewed and examined the patient. I have reviewed the patient's H&P dated: 04/25/17. There are no significant changes.  H&P update referred to the surgeon.         Ready For Surgery From Anesthesia Perspective.

## 2017-04-25 NOTE — BRIEF OP NOTE
[facility]  Brief Operative Note     SUMMARY     Surgery Date: 4/25/2017     Surgeon(s) and Role:     * Jonathan Valverde MD - Primary    Assisting Surgeon: None    Pre-op Diagnosis:  Adenomatous polyp of ascending colon [D12.2]    Post-op Diagnosis:  Post-Op Diagnosis Codes:     * Adenomatous polyp of ascending colon [D12.2]    Procedure(s) (LRB):  COLONOSCOPY (N/A)    Anesthesia: Monitor Anesthesia Care    Description of the findings of the procedure: Procedure completed. See Procedure note for details.     Findings/Key Components:  Procedure completed. See Procedure note for details.     Prosthetic/Devices: None    Estimated Blood Loss: None         Specimens:   Specimen (12h ago through future)    Start     Ordered    04/25/17 0958  Specimen to Pathology - Surgery  Once     Comments:  1. Cecum polyps  2. Transverse colon polyps X 6    04/25/17 1010          Discharge Note    SUMMARY     Admit Date: 4/25/2017    Discharge Date and Time: 4/25/2017    Hospital Course (synopsis of major diagnoses, care, treatment, and services provided during the course of the hospital stay): Procedure completed. See Procedure note for details.     Final Diagnosis: Post-Op Diagnosis Codes:     * Adenomatous polyp of ascending colon [D12.2]    Disposition: Discharge home when discharge criteria met.    Follow Up/Patient Instructions: With primary care physician as previously scheduled.    Medications:  Reconciled Home Medications: Current Discharge Medication List      CONTINUE these medications which have NOT CHANGED    Details   alprazolam (XANAX) 1 MG tablet TK 1 T PO BID  Refills: 5      doxepin (SINEQUAN) 10 MG capsule Take 10 mg by mouth every evening.      estradiol (ESTRACE) 1 MG tablet TK 1 T PO QD  Refills: 5      furosemide (LASIX) 20 MG tablet TK 1 T PO QD  Refills: 5      gabapentin (NEURONTIN) 100 MG capsule       ibandronate (BONIVA) 150 mg tablet TK 1 T PO Q MONTH  Refills: 5      linaclotide  (LINZESS) 145 mcg Cap capsule Take 1 capsule (145 mcg total) by mouth once daily.  Qty: 30 capsule, Refills: 2      perphenazine-amitriptyline 2-10 mg (TRIAVIL 2-10) 2-10 mg Tab TK 1 T PO BID  Refills: 5      promethazine (PHENERGAN) 25 MG tablet Take 1 tablet (25 mg total) by mouth every 6 (six) hours as needed for Nausea.  Qty: 6 tablet, Refills: 0      hydrocodone-acetaminophen 7.5-325mg (NORCO) 7.5-325 mg per tablet TK 1 T PO Q 6 TO 8 H PRN P  Refills: 0         STOP taking these medications       sodium,potassium,mag sulfates (SUPREP BOWEL PREP KIT) 17.5-3.13-1.6 gram SolR Comments:   Reason for Stopping:               Discharge Procedure Orders  Diet general     Activity as tolerated       Follow-up Information     Follow up with Arturo Camacho DO.    Specialty:  Family Medicine    Contact information:    [address] [place] [address] [address]  [place]ot[place] [address] [phone]

## 2017-04-25 NOTE — ANESTHESIA POSTPROCEDURE EVALUATION
Anesthesia Post Evaluation    Patient: Macie Charles    Procedure(s) Performed: Procedure(s) (LRB):  COLONOSCOPY (N/A)    Final Anesthesia Type: MAC  Patient location during evaluation: PACU  Patient participation: Yes- Able to Participate  Level of consciousness: awake and alert and oriented  Post-procedure vital signs: reviewed and stable  Pain management: adequate  Airway patency: patent  PONV status at discharge: No PONV  Anesthetic complications: no      Cardiovascular status: blood pressure returned to baseline  Respiratory status: unassisted, spontaneous ventilation and room air  Hydration status: euvolemic  Follow-up not needed.        Visit Vitals    /69 (BP Location: Left arm, Patient Position: Lying, BP Method: Automatic)    Pulse 76    Temp 36.5 °C (97.7 °F)    Resp 16    Ht 5' (1.524 m)    Wt 55.3 kg (122 lb)    SpO2 99%    Breastfeeding No    BMI 23.83 kg/m2       Pain/Alessio Score: Pain Assessment Performed: Yes (4/25/2017 10:14 AM)  Presence of Pain: denies (4/25/2017 10:14 AM)  Alessio Score: 8 (4/25/2017 10:14 AM)

## 2017-05-03 ENCOUNTER — TELEPHONE (OUTPATIENT)
Dept: GASTROENTEROLOGY | Facility: CLINIC | Age: 72
End: 2017-05-03

## 2017-05-03 ENCOUNTER — OFFICE VISIT (OUTPATIENT)
Dept: SURGERY | Facility: CLINIC | Age: 72
End: 2017-05-03
Payer: MEDICARE

## 2017-05-03 VITALS
HEART RATE: 91 BPM | SYSTOLIC BLOOD PRESSURE: 140 MMHG | DIASTOLIC BLOOD PRESSURE: 72 MMHG | BODY MASS INDEX: 23.83 KG/M2 | TEMPERATURE: 99 F | WEIGHT: 122 LBS

## 2017-05-03 DIAGNOSIS — K63.5 POLYP OF ASCENDING COLON, UNSPECIFIED TYPE: Primary | ICD-10-CM

## 2017-05-03 PROCEDURE — 99203 OFFICE O/P NEW LOW 30 MIN: CPT | Mod: S$PBB,,, | Performed by: SURGERY

## 2017-05-03 PROCEDURE — 99999 PR PBB SHADOW E&M-EST. PATIENT-LVL IV: CPT | Mod: PBBFAC,,, | Performed by: SURGERY

## 2017-05-03 PROCEDURE — 99214 OFFICE O/P EST MOD 30 MIN: CPT | Mod: PBBFAC | Performed by: SURGERY

## 2017-05-03 RX ORDER — POLYETHYLENE GLYCOL 3350, SODIUM SULFATE ANHYDROUS, SODIUM BICARBONATE, SODIUM CHLORIDE, POTASSIUM CHLORIDE 236; 22.74; 6.74; 5.86; 2.97 G/4L; G/4L; G/4L; G/4L; G/4L
4 POWDER, FOR SOLUTION ORAL ONCE
Qty: 4000 ML | Refills: 0 | Status: SHIPPED | OUTPATIENT
Start: 2017-05-03 | End: 2017-05-03

## 2017-05-03 RX ORDER — ACETAMINOPHEN 10 MG/ML
1000 INJECTION, SOLUTION INTRAVENOUS EVERY 8 HOURS
Status: CANCELLED | OUTPATIENT
Start: 2017-05-03 | End: 2017-05-04

## 2017-05-03 RX ORDER — METRONIDAZOLE 500 MG/100ML
500 INJECTION, SOLUTION INTRAVENOUS
Status: CANCELLED | OUTPATIENT
Start: 2017-05-03

## 2017-05-03 RX ORDER — ALVIMOPAN 12 MG/1
12 CAPSULE ORAL
Status: CANCELLED | OUTPATIENT
Start: 2017-06-13 | End: 2017-06-14

## 2017-05-03 RX ORDER — NEOMYCIN SULFATE 500 MG/1
500 TABLET ORAL
Qty: 6 TABLET | Refills: 0 | Status: ON HOLD | OUTPATIENT
Start: 2017-05-03 | End: 2018-05-03

## 2017-05-03 RX ORDER — METRONIDAZOLE 500 MG/1
500 TABLET ORAL
Qty: 3 TABLET | Refills: 0 | Status: SHIPPED | OUTPATIENT
Start: 2017-05-03 | End: 2017-05-13

## 2017-05-03 RX ORDER — SODIUM CHLORIDE 9 MG/ML
INJECTION, SOLUTION INTRAVENOUS CONTINUOUS
Status: CANCELLED | OUTPATIENT
Start: 2017-05-03

## 2017-05-03 NOTE — LETTER
May 3, 2017      Jonathan Valverde MD  [address] [place] [address] [address] John [place] [address]           [facility]  [address] [place] [address] [address] [place] [address]  Phone: [phone]  Fax: [phone]          Patient: Macie Charles   MR Number: [id number]   YOB: 1945   Date of Visit: 5/3/2017       Dear Dr. Jonathan Valverde:    Thank you for referring Macie Charles to me for evaluation. Attached you will find relevant portions of my assessment and plan of care.    If you have questions, please do not hesitate to call me. I look forward to following Macie Charles along with you.    Sincerely,    Haroldo Robles MD    Enclosure  CC:  No Recipients    If you would like to receive this communication electronically, please contact [email or web address] or [phone] to request more information on [employer] Link access.    For providers and/or their staff who would like to refer a patient to [facility], please contact us through our one-stop-shop provider referral line, [facility], at [phone].    If you feel you have received this communication in error or would no longer like to receive these types of communications, please e-mail [email or web address]

## 2017-05-03 NOTE — TELEPHONE ENCOUNTER
Returned call to pt who stated she was sick to her stomach this morning and went to urgent care and was told that she is constipated. Pt is seeing Gen/Surg today but would like to make Dr. Valverde aware and see if there is anything she would recommend. Please advise.

## 2017-05-03 NOTE — TELEPHONE ENCOUNTER
She was prescribed linzess which she didn't tolerate well.  She can take Miralax prep to help started with bowel movement and then take Miralax BID

## 2017-05-03 NOTE — TELEPHONE ENCOUNTER
----- Message from Rivka Olivas sent at 5/3/2017 11:48 AM CDT -----  Contact: pt  Please call pt at [phone] re she is feeling very ill this morning and went to an urgent care for bad constipation again.

## 2017-05-03 NOTE — MR AVS SNAPSHOT
[facility]  [address] [address]  Phone: [phone]  Fax: [phone]                  Maice Charles   5/3/2017 2:40 PM   Office Visit    Description:  Female : 1945   Provider:  Haroldo Robles MD   Department:  [facility]           Reason for Visit     Consult           Diagnoses this Visit        Comments    Polyp of ascending colon, unspecified type    -  Primary     Colon polyps                To Do List           Future Appointments        Provider Department Dept Phone    5/15/2017 11:15 AM EKG, [facility] CARDIO [facility] [phone]    5/15/2017 11:30 AM ONLH XR1- [facility] [phone]    5/15/2017 3:00 PM LABORATORY, [facility] JUN [facility] [phone]    2017 1:20 PM Haroldo Robles MD [facility] [phone]      Goals (5 Years of Data)     None       These Medications        Disp Refills Start End    neomycin (MYCIFRADIN) 500 mg Tab 6 tablet 0 5/3/2017     Take 1 tablet (500 mg total) by mouth as needed (see instructions). Take 2 tablets at 1 pm, 2pm, and 11pm the day prior to surgery - Oral    Pharmacy: [id number] - [place], [place] - [address] AT [address] OF [address] Ph #: [phone]       metronidazole (FLAGYL) 500 MG tablet 3 tablet 0 5/3/2017 2017    Take 1 tablet (500 mg total) by mouth as needed (see directions). Take 1 tablet at 1 pm, 2pm, and 11pm the day prior to surgery - Oral    [id number] [place] - [address] AT [address] OF [address] Ph #: [phone]       polyethylene glycol (GOLYTELY,NULYTELY) 236-22.74-6.74 -5.86 gram suspension 4000 mL 0 5/3/2017 5/3/2017    Take 4,000 mLs (4 L total) by mouth once. - Oral    Pharmacy: [id number] - [place] - [address] AT [facility] [address] Ph #: [phone]         [facility]     [facility] Nurse Care [facility] - 24/7 Assistance  Unless otherwise directed by your provider, please contact [facility], our nurse care line that is available for 24/7 assistance.     Registered nurses in the [facility] provide: appointment scheduling, clinical advisement, health education, and other advisory services.  Call: [phone] (toll free)               Medications           Message regarding Medications     Verify the changes and/or additions to your medication regime listed below are the same as discussed with your clinician today.  If any of these changes or additions are incorrect, please notify your healthcare provider.        START taking these NEW medications        Refills    neomycin (MYCIFRADIN) 500 mg Tab 0    Sig: Take 1 tablet (500 mg total) by mouth as needed (see instructions). Take 2 tablets at 1 pm, 2pm, and 11pm the day prior to surgery    Class: Normal    Route: Oral    metronidazole (FLAGYL) 500 MG tablet 0    Sig: Take 1 tablet (500 mg total) by mouth as needed (see directions). Take 1 tablet at 1 pm, 2pm, and 11pm the day prior to surgery    Class: Normal    Route: Oral    polyethylene glycol (GOLYTELY,NULYTELY) 236-22.74-6.74 -5.86 gram suspension 0    Sig: Take 4,000 mLs (4 L total) by mouth once.    Class: Normal    Route: Oral      STOP taking these medications     linaclotide (LINZESS) 145 mcg Cap capsule Take 1 capsule (145 mcg total) by mouth once daily.    perphenazine-amitriptyline 2-10 mg (TRIAVIL 2-10) 2-10 mg Tab TK 1 T PO BID           Verify that the below list of medications is an accurate representation of the medications you are currently taking.  If none reported, the list may be blank. If incorrect, please contact your healthcare provider. Carry this list with you in case of emergency.           Current Medications     alprazolam (XANAX) 1 MG tablet TK 1 T PO BID    doxepin (SINEQUAN) 10 MG capsule Take 10 mg by mouth every evening.     estradiol (ESTRACE) 1 MG tablet TK 1 T PO QD    furosemide (LASIX) 20 MG tablet TK 1 T PO QD    gabapentin (NEURONTIN) 100 MG capsule     hydrocodone-acetaminophen 7.5-325mg (NORCO) 7.5-325 mg per tablet TK 1 T PO Q 6 TO 8 H PRN P    ibandronate (BONIVA) 150 mg tablet TK 1 T PO Q MONTH    metronidazole (FLAGYL) 500 MG tablet Take 1 tablet (500 mg total) by mouth as needed (see directions). Take 1 tablet at 1 pm, 2pm, and 11pm the day prior to surgery    neomycin (MYCIFRADIN) 500 mg Tab Take 1 tablet (500 mg total) by mouth as needed (see instructions). Take 2 tablets at 1 pm, 2pm, and 11pm the day prior to surgery    polyethylene glycol (GOLYTELY,NULYTELY) 236-22.74-6.74 -5.86 gram suspension Take 4,000 mLs (4 L total) by mouth once.    promethazine (PHENERGAN) 25 MG tablet Take 1 tablet (25 mg total) by mouth every 6 (six) hours as needed for Nausea.           Clinical Reference Information           Your Vitals Were     BP Pulse Temp Weight BMI    140/72 91 98.6 °F (37 °C) 55.3 kg (122 lb) 23.83 kg/m2      Blood Pressure          Most Recent Value    BP  (!)  140/72      Allergies as of 5/3/2017     Ceftin [Cefuroxime Axetil]    Ciprofloxacin    Morphine    Pcn [Penicillins]      Immunizations Administered on Date of Encounter - 5/3/2017     None      Orders Placed During Today's Visit      Normal Orders This Visit    Case Request Operating Room: ROBOTIC ASSISTED COLECTOMY-LAPAROSCOPIC     Future Labs/Procedures Expected by Expires    Basic metabolic panel  5/3/2017 7/2/2018    CBC auto differential  5/3/2017 7/2/2018    X-Ray Chest 1 View  5/3/2017 5/3/2018    EKG 12-lead  As directed 5/3/2018      Language Assistance Services     ATTENTION: Language assistance services are available, free of charge. Please call [phone].      ATENCIÓN: Si habla consuelo, tiene a castro disposición servicios gratuitos de asistencia lingüística. Llame al [phone].     CHÚ Ý: N?u b?n nói Ti?ng Vi?t, có các d?ch v? h? tr?  omid gonzalez? mi?n phí dành cho b?n. G?i s? [phone].         [facility] complies with applicable Federal civil rights laws and does not discriminate on the basis of race, color, national origin, age, disability, or sex.

## 2017-05-03 NOTE — PROGRESS NOTES
History & Physical    SUBJECTIVE:     History of Present Illness:  Patient is a 71 y.o. female referred for numerous descending colon polyps and discussion for partial colectomy.  The patient recently underwent colonoscopy which showed numerous carpet-like polyps of her ascending colon with some small sessile polyps in her transverse colon.  The biopsies are still pending.  It was discussed with the patient that due to the nature of these polyps and the number of them it would be impossible to continue surveillance with colonoscopies.  She denies any family history of colon cancer.  She denies any weight loss or change in bowels.  She has had chronic constipation for years although she has bowel movements every day she continually feels like she is bloated.  She has tried multiple medications including fiber stool softeners and laxatives and is currently looking into further medications with GI.    Chief Complaint   Patient presents with    Consult       Review of patient's allergies indicates:   Allergen Reactions    Ceftin [cefuroxime axetil] Rash    Ciprofloxacin Rash    Morphine Rash    Pcn [penicillins] Rash       Current Outpatient Prescriptions   Medication Sig Dispense Refill    alprazolam (XANAX) 1 MG tablet TK 1 T PO BID  5    doxepin (SINEQUAN) 10 MG capsule Take 10 mg by mouth every evening.      estradiol (ESTRACE) 1 MG tablet TK 1 T PO QD  5    furosemide (LASIX) 20 MG tablet TK 1 T PO QD  5    gabapentin (NEURONTIN) 100 MG capsule       hydrocodone-acetaminophen 7.5-325mg (NORCO) 7.5-325 mg per tablet TK 1 T PO Q 6 TO 8 H PRN P  0    ibandronate (BONIVA) 150 mg tablet TK 1 T PO Q MONTH  5    promethazine (PHENERGAN) 25 MG tablet Take 1 tablet (25 mg total) by mouth every 6 (six) hours as needed for Nausea. 6 tablet 0    metronidazole (FLAGYL) 500 MG tablet Take 1 tablet (500 mg total) by mouth as needed (see directions). Take 1 tablet at 1 pm, 2pm, and 11pm the day prior to surgery 3  tablet 0    neomycin (MYCIFRADIN) 500 mg Tab Take 1 tablet (500 mg total) by mouth as needed (see instructions). Take 2 tablets at 1 pm, 2pm, and 11pm the day prior to surgery 6 tablet 0    polyethylene glycol (GOLYTELY,NULYTELY) 236-22.74-6.74 -5.86 gram suspension Take 4,000 mLs (4 L total) by mouth once. 4000 mL 0     No current facility-administered medications for this visit.        Past Medical History:   Diagnosis Date    Anemia     Diabetes mellitus     Periodic limb movement disorder      Past Surgical History:   Procedure Laterality Date    APPENDECTOMY      bladder hydrodistinction       COLONOSCOPY N/A 3/15/2017    Procedure: COLONOSCOPY;  Surgeon: Jonathan Valverde MD;  Location: [facility];  Service: Endoscopy;  Laterality: N/A;    COLONOSCOPY N/A 3/14/2017    Procedure: COLONOSCOPY;  Surgeon: Eder Calvo MD;  Location: [facility];  Service: Endoscopy;  Laterality: N/A;    COLONOSCOPY N/A 4/25/2017    Procedure: COLONOSCOPY;  Surgeon: Jonathan Valverde MD;  Location: [facility];  Service: Endoscopy;  Laterality: N/A;    HYSTERECTOMY      TUBAL LIGATION       Family History   Problem Relation Age of Onset    Heart disease Mother     Heart disease Father      Social History   Substance Use Topics    Smoking status: Never Smoker    Smokeless tobacco: Never Used    Alcohol use Yes      Comment: 1 marguerita monthly        Review of Systems:  Review of Systems   Constitutional: Negative for activity change, appetite change, chills, fever and unexpected weight change.   HENT: Negative for congestion.    Eyes: Negative for visual disturbance.   Respiratory: Negative for cough and shortness of breath.    Cardiovascular: Negative for chest pain, palpitations and leg swelling.   Gastrointestinal: Positive for constipation. Negative for abdominal distention, abdominal pain, diarrhea, nausea and vomiting.   Endocrine: Negative for polyuria.   Genitourinary: Negative for dysuria.    Musculoskeletal: Positive for back pain.   Skin: Negative for rash.   Neurological: Negative for dizziness and light-headedness.   Hematological: Negative for adenopathy.       OBJECTIVE:     Vital Signs (Most Recent)  Temp: 98.6 °F (37 °C) (05/03/17 1414)  Pulse: 91 (05/03/17 1414)  BP: (!) 140/72 (05/03/17 1414)     55.3 kg (122 lb)     Physical Exam:  Physical Exam   Constitutional: She is oriented to person, place, and time. She appears well-developed and well-nourished.   HENT:   Head: Normocephalic and atraumatic.   Eyes: EOM are normal.   Neck: Neck supple.   Cardiovascular: Normal rate and regular rhythm.    Pulmonary/Chest: Effort normal and breath sounds normal.   Abdominal: Soft. Bowel sounds are normal. She exhibits no distension. There is no tenderness.   Well-healed lower midline scar   Neurological: She is alert and oriented to person, place, and time.   Skin: Skin is warm and dry.   Vitals reviewed.    Colonoscopy:  Findings:       The perianal exam findings include non-thrombosed external        hemorrhoids.       Multiple carpet-like and flat polyps were found in the ascending        colon and cecum. Too numerous to count. The polyps were 1 to 5 mm in        size. These polyps were removed with a cold biopsy forceps.        Resection was complete, but the polyp tissue was only partially        retrieved.       Six sessile polyps were found in the transverse colon. The polyps        were 2 to 3 mm in size. These polyps were removed with a cold biopsy        forceps. Resection and retrieval were complete.       Non-bleeding internal hemorrhoids were found during retroflexion.        The hemorrhoids were small.      ASSESSMENT/PLAN:     71-year-old female with numerous carpet-like polyps of the descending colon    PLAN:Plan     Robotic right hemicolectomy possible lap, possible open 6/13/17 Dr. Gr to assist  Preoperative: CBC, CMP, chest x-ray, EKG.  Bowel prep the night prior to  surgery  Discussed with patient risk and benefits of surgery including: Pain, bleeding, infection, risk of injury to underlying organs, stricture or leak from anastomosis, possible open surgery  Patient understands that due to difficult screening surveillance would be impossible.  She understands the risks and agrees to proceed with surgery

## 2017-05-05 ENCOUNTER — TELEPHONE (OUTPATIENT)
Dept: GASTROENTEROLOGY | Facility: CLINIC | Age: 72
End: 2017-05-05

## 2017-05-05 NOTE — TELEPHONE ENCOUNTER
Returned call to pt who stated she would like to have a copy of her Colonoscopy report with pics from 3/15/17 sent to her by mail. Pt was given phone number to HIM Dept.

## 2017-05-05 NOTE — TELEPHONE ENCOUNTER
----- Message from Tess Berrios sent at 5/5/2017 10:43 AM CDT -----  Contact: pt   Pt needs a copy of her colonoscopy result that was done back in March 15,, please call pt back at  [phone]

## 2017-05-07 ENCOUNTER — PATIENT MESSAGE (OUTPATIENT)
Dept: GASTROENTEROLOGY | Facility: CLINIC | Age: 72
End: 2017-05-07

## 2017-05-08 ENCOUNTER — TELEPHONE (OUTPATIENT)
Dept: GASTROENTEROLOGY | Facility: CLINIC | Age: 72
End: 2017-05-08

## 2017-05-08 NOTE — TELEPHONE ENCOUNTER
Returned call to pt who stated she would like a call from Dr. Valverde concerning possible surgery with Dr. Robles. Pt is aware that Dr. Valverde will be out until 5/10/17.

## 2017-05-10 NOTE — TELEPHONE ENCOUNTER
Responded to her messages via portal. Called and left her a message to call back if she has any further questions

## 2017-05-15 ENCOUNTER — HOSPITAL ENCOUNTER (OUTPATIENT)
Dept: RADIOLOGY | Facility: HOSPITAL | Age: 72
Discharge: HOME | End: 2017-05-15
Attending: SURGERY
Payer: MEDICARE

## 2017-05-15 ENCOUNTER — CLINICAL SUPPORT (OUTPATIENT)
Dept: CARDIOLOGY | Facility: CLINIC | Age: 72
End: 2017-05-15
Payer: MEDICARE

## 2017-05-15 DIAGNOSIS — K63.5 POLYP OF ASCENDING COLON, UNSPECIFIED TYPE: ICD-10-CM

## 2017-05-15 PROCEDURE — 93010 ELECTROCARDIOGRAM REPORT: CPT | Mod: S$PBB,,, | Performed by: INTERNAL MEDICINE

## 2017-05-15 PROCEDURE — 71010 XR CHEST 1 VIEW: CPT | Mod: 26,,, | Performed by: RADIOLOGY

## 2017-05-15 PROCEDURE — 93005 ELECTROCARDIOGRAM TRACING: CPT | Mod: PBBFAC | Performed by: INTERNAL MEDICINE

## 2017-06-09 RX ORDER — PERPHENAZINE AND AMITRIPTYLINE HYDROCHLORIDE 2; 10 MG/1; MG/1
1 TABLET, FILM COATED ORAL 2 TIMES DAILY
COMMUNITY

## 2017-06-09 NOTE — PRE ADMISSION SCREENING
Pre op instructions reviewed with patient per phone:    To confirm, Your surgeon has instructed you:  Surgery is scheduled 06/13/17 at 0937.      Please report to [facility] 1st floor main lobby by [phone]  Pre admit office to call afternoon prior to surgery with final arrival time.      INSTRUCTIONS IMPORTANT!!!  ¨ Do not eat, drink, or smoke after 12 midnight, clear liquid diet day before surgery with bowel prep/Dr Robles's instructions.   OK to brush teeth, no gum, candy or mints!    ¨ Take only these medicines with a small swallow of water-morning of surgery.  Neomycin, as directed  Ducolax, as directed per Dr Robles      ____  Do not wear makeup, including mascara.  ____  No powder, lotions or creams to surgical area.  ____  Please remove all jewelry, including piercings and leave at home.  ____  No money or valuables needed. Please leave at home.  ____  Please bring identification and insurance information to hospital.  ____  If going home the same day, arrange for a ride home. You will not be able to   drive if Anesthesia was used.  ____  Children, under 12 years old, must remain in the waiting room with an adult.  They are not allowed in patient areas.  ____  Wear loose fitting clothing. Allow for dressings, bandages.  ____  Stop Aspirin, Ibuprofen, Motrin and Aleve at least 5-7 days before surgery, unless otherwise instructed by your doctor, or the nurse.   You MAY use Tylenol/acetaminophen until day of surgery.  ____  If you take diabetic medication, do not take am of surgery unless instructed by   Doctor.  ____ Stop taking any Fish Oil supplement or any Vitamins that contain Vitamin E at least 5 days prior to surgery.          Bathing Instructions-- The night before surgery and the morning prior to coming to the hospital:   -Do not shave the surgical area.   -Shower and wash your hair and body as usual with anti-bacterial  soap and shampoo.   -Rinse your hair and body  completely.   -Use one packet of hibiclens to wash the surgical site (using your hand) gently for 5 minutes.  Do not scrub you skin too hard.   -Do not use hibiclens on your head, face, or genitals.   -Do not wash with anti-bacterial soap after you use the hibiclens.   -Rinse your body thoroughly.   -Dry with clean, soft towel.  Do not use lotion, cream, deodorant, or powders on   the surgical site.    Use antibacterial soap in place of hibiclens if your surgery is on the head, face or genitals.         Surgical Site Infection    Prevention of surgical site infections:     -Keep incisions clean and dry.   -Do not soak/submerge incisions in water until completely healed.   -Do not apply lotions, powders, creams, or deodorants to site.   -Always make sure hands are cleaned with antibacterial soap/ alcohol-based   prior to touching the surgical site.  (This includes doctors, nurses, staff, and yourself.)    Signs and symptoms:   -Redness and pain around the area where you had surgery   -Drainage of cloudy fluid from your surgical wound   -Fever over 100.4  I have read or had read and explained to me, and understand the above information.

## 2017-06-12 ENCOUNTER — ANESTHESIA EVENT (OUTPATIENT)
Dept: SURGERY | Facility: HOSPITAL | Age: 72
DRG: 331 | End: 2017-06-12
Payer: MEDICARE

## 2017-06-13 ENCOUNTER — HOSPITAL ENCOUNTER (INPATIENT)
Facility: HOSPITAL | Age: 72
LOS: 3 days | Discharge: HOME | DRG: 331 | End: 2017-06-16
Attending: SURGERY | Admitting: SURGERY
Payer: MEDICARE

## 2017-06-13 ENCOUNTER — SURGERY (OUTPATIENT)
Age: 72
End: 2017-06-13

## 2017-06-13 ENCOUNTER — ANESTHESIA (OUTPATIENT)
Dept: SURGERY | Facility: HOSPITAL | Age: 72
DRG: 331 | End: 2017-06-13
Payer: MEDICARE

## 2017-06-13 DIAGNOSIS — K63.5 COLON POLYPS: ICD-10-CM

## 2017-06-13 DIAGNOSIS — K63.5 POLYP OF ASCENDING COLON, UNSPECIFIED TYPE: Primary | ICD-10-CM

## 2017-06-13 DIAGNOSIS — D63.8 CHRONIC DISEASE ANEMIA: ICD-10-CM

## 2017-06-13 LAB
POCT GLUCOSE: 102 MG/DL (ref 70–110)
POCT GLUCOSE: 106 MG/DL (ref 70–110)

## 2017-06-13 PROCEDURE — 27000221 HC OXYGEN, UP TO 24 HOURS

## 2017-06-13 PROCEDURE — 25000003 PHARM REV CODE 250: Performed by: SURGERY

## 2017-06-13 PROCEDURE — 94770 HC EXHALED C02 TEST: CPT

## 2017-06-13 PROCEDURE — 0DTF4ZZ RESECTION OF RIGHT LARGE INTESTINE, PERCUTANEOUS ENDOSCOPIC APPROACH: ICD-10-PCS | Performed by: SURGERY

## 2017-06-13 PROCEDURE — 63600175 PHARM REV CODE 636 W HCPCS: Performed by: ANESTHESIOLOGY

## 2017-06-13 PROCEDURE — 27201423 OPTIME MED/SURG SUP & DEVICES STERILE SUPPLY: Performed by: SURGERY

## 2017-06-13 PROCEDURE — 8E0W4CZ ROBOTIC ASSISTED PROCEDURE OF TRUNK REGION, PERCUTANEOUS ENDOSCOPIC APPROACH: ICD-10-PCS | Performed by: SURGERY

## 2017-06-13 PROCEDURE — 63600175 PHARM REV CODE 636 W HCPCS: Performed by: NURSE ANESTHETIST, CERTIFIED REGISTERED

## 2017-06-13 PROCEDURE — 11000001 HC ACUTE MED/SURG PRIVATE ROOM

## 2017-06-13 PROCEDURE — 37000008 HC ANESTHESIA 1ST 15 MINUTES: Performed by: SURGERY

## 2017-06-13 PROCEDURE — 37000009 HC ANESTHESIA EA ADD 15 MINS: Performed by: SURGERY

## 2017-06-13 PROCEDURE — 36000713 HC OR TIME LEV V EA ADD 15 MIN: Performed by: SURGERY

## 2017-06-13 PROCEDURE — 88309 TISSUE EXAM BY PATHOLOGIST: CPT | Performed by: PATHOLOGY

## 2017-06-13 PROCEDURE — 99900035 HC TECH TIME PER 15 MIN (STAT)

## 2017-06-13 PROCEDURE — 25000003 PHARM REV CODE 250: Performed by: ANESTHESIOLOGY

## 2017-06-13 PROCEDURE — 36000712 HC OR TIME LEV V 1ST 15 MIN: Performed by: SURGERY

## 2017-06-13 PROCEDURE — 71000039 HC RECOVERY, EACH ADD'L HOUR: Performed by: SURGERY

## 2017-06-13 PROCEDURE — 63600175 PHARM REV CODE 636 W HCPCS: Performed by: SURGERY

## 2017-06-13 PROCEDURE — 99900031 HC PATIENT EDUCATION (STAT)

## 2017-06-13 PROCEDURE — 71000033 HC RECOVERY, INTIAL HOUR: Performed by: SURGERY

## 2017-06-13 PROCEDURE — 88309 TISSUE EXAM BY PATHOLOGIST: CPT | Mod: 26,,, | Performed by: PATHOLOGY

## 2017-06-13 PROCEDURE — 94799 UNLISTED PULMONARY SVC/PX: CPT

## 2017-06-13 PROCEDURE — 25000003 PHARM REV CODE 250: Performed by: NURSE ANESTHETIST, CERTIFIED REGISTERED

## 2017-06-13 RX ORDER — ALVIMOPAN 12 MG/1
12 CAPSULE ORAL
Status: COMPLETED | OUTPATIENT
Start: 2017-06-13 | End: 2017-06-13

## 2017-06-13 RX ORDER — DIPHENHYDRAMINE HYDROCHLORIDE 50 MG/ML
25 INJECTION INTRAMUSCULAR; INTRAVENOUS EVERY 4 HOURS PRN
Status: DISCONTINUED | OUTPATIENT
Start: 2017-06-13 | End: 2017-06-16 | Stop reason: HOSPADM

## 2017-06-13 RX ORDER — SODIUM CHLORIDE 9 MG/ML
3 INJECTION, SOLUTION INTRAMUSCULAR; INTRAVENOUS; SUBCUTANEOUS EVERY 8 HOURS
Status: DISCONTINUED | OUTPATIENT
Start: 2017-06-13 | End: 2017-06-13 | Stop reason: HOSPADM

## 2017-06-13 RX ORDER — GENTAMICIN SULFATE 80 MG/100ML
INJECTION, SOLUTION INTRAVENOUS
Status: DISCONTINUED | OUTPATIENT
Start: 2017-06-13 | End: 2017-06-13

## 2017-06-13 RX ORDER — MIDAZOLAM HYDROCHLORIDE 1 MG/ML
INJECTION, SOLUTION INTRAMUSCULAR; INTRAVENOUS
Status: DISCONTINUED | OUTPATIENT
Start: 2017-06-13 | End: 2017-06-13

## 2017-06-13 RX ORDER — NALOXONE HCL 0.4 MG/ML
0.02 VIAL (ML) INJECTION
Status: DISCONTINUED | OUTPATIENT
Start: 2017-06-13 | End: 2017-06-14

## 2017-06-13 RX ORDER — GLUCAGON 1 MG
1 KIT INJECTION
Status: DISCONTINUED | OUTPATIENT
Start: 2017-06-13 | End: 2017-06-16 | Stop reason: HOSPADM

## 2017-06-13 RX ORDER — SODIUM CHLORIDE 9 MG/ML
INJECTION, SOLUTION INTRAVENOUS CONTINUOUS
Status: DISCONTINUED | OUTPATIENT
Start: 2017-06-13 | End: 2017-06-13

## 2017-06-13 RX ORDER — SODIUM CHLORIDE, SODIUM LACTATE, POTASSIUM CHLORIDE, CALCIUM CHLORIDE 600; 310; 30; 20 MG/100ML; MG/100ML; MG/100ML; MG/100ML
INJECTION, SOLUTION INTRAVENOUS CONTINUOUS
Status: DISCONTINUED | OUTPATIENT
Start: 2017-06-13 | End: 2017-06-13

## 2017-06-13 RX ORDER — ALVIMOPAN 12 MG/1
12 CAPSULE ORAL ONCE
Status: DISCONTINUED | OUTPATIENT
Start: 2017-06-13 | End: 2017-06-13

## 2017-06-13 RX ORDER — INSULIN ASPART 100 [IU]/ML
0-5 INJECTION, SOLUTION INTRAVENOUS; SUBCUTANEOUS EVERY 6 HOURS PRN
Status: DISCONTINUED | OUTPATIENT
Start: 2017-06-13 | End: 2017-06-16 | Stop reason: HOSPADM

## 2017-06-13 RX ORDER — SODIUM CHLORIDE, SODIUM LACTATE, POTASSIUM CHLORIDE, CALCIUM CHLORIDE 600; 310; 30; 20 MG/100ML; MG/100ML; MG/100ML; MG/100ML
INJECTION, SOLUTION INTRAVENOUS CONTINUOUS
Status: DISCONTINUED | OUTPATIENT
Start: 2017-06-13 | End: 2017-06-14

## 2017-06-13 RX ORDER — ACETAMINOPHEN 10 MG/ML
1000 INJECTION, SOLUTION INTRAVENOUS EVERY 8 HOURS
Status: COMPLETED | OUTPATIENT
Start: 2017-06-13 | End: 2017-06-13

## 2017-06-13 RX ORDER — ACETAMINOPHEN 10 MG/ML
1000 INJECTION, SOLUTION INTRAVENOUS EVERY 8 HOURS
Status: COMPLETED | OUTPATIENT
Start: 2017-06-13 | End: 2017-06-14

## 2017-06-13 RX ORDER — ONDANSETRON 2 MG/ML
INJECTION INTRAMUSCULAR; INTRAVENOUS
Status: DISCONTINUED | OUTPATIENT
Start: 2017-06-13 | End: 2017-06-13

## 2017-06-13 RX ORDER — LIDOCAINE HYDROCHLORIDE 20 MG/ML
INJECTION, SOLUTION EPIDURAL; INFILTRATION; INTRACAUDAL; PERINEURAL
Status: DISCONTINUED | OUTPATIENT
Start: 2017-06-13 | End: 2017-06-13

## 2017-06-13 RX ORDER — METRONIDAZOLE 500 MG/100ML
500 INJECTION, SOLUTION INTRAVENOUS
Status: COMPLETED | OUTPATIENT
Start: 2017-06-13 | End: 2017-06-14

## 2017-06-13 RX ORDER — SODIUM CHLORIDE 0.9 % (FLUSH) 0.9 %
3 SYRINGE (ML) INJECTION
Status: DISCONTINUED | OUTPATIENT
Start: 2017-06-13 | End: 2017-06-13

## 2017-06-13 RX ORDER — HYDROMORPHONE HCL IN 0.9% NACL 6 MG/30 ML
PATIENT CONTROLLED ANALGESIA SYRINGE INTRAVENOUS CONTINUOUS
Status: DISCONTINUED | OUTPATIENT
Start: 2017-06-13 | End: 2017-06-14

## 2017-06-13 RX ORDER — MEPERIDINE HYDROCHLORIDE 50 MG/ML
12.5 INJECTION INTRAMUSCULAR; INTRAVENOUS; SUBCUTANEOUS ONCE AS NEEDED
Status: DISCONTINUED | OUTPATIENT
Start: 2017-06-13 | End: 2017-06-13 | Stop reason: HOSPADM

## 2017-06-13 RX ORDER — PANTOPRAZOLE SODIUM 40 MG/10ML
40 INJECTION, POWDER, LYOPHILIZED, FOR SOLUTION INTRAVENOUS
Status: DISCONTINUED | OUTPATIENT
Start: 2017-06-14 | End: 2017-06-16 | Stop reason: HOSPADM

## 2017-06-13 RX ORDER — METRONIDAZOLE 500 MG/100ML
500 INJECTION, SOLUTION INTRAVENOUS
Status: COMPLETED | OUTPATIENT
Start: 2017-06-13 | End: 2017-06-13

## 2017-06-13 RX ORDER — PROPOFOL 10 MG/ML
VIAL (ML) INTRAVENOUS
Status: DISCONTINUED | OUTPATIENT
Start: 2017-06-13 | End: 2017-06-13

## 2017-06-13 RX ORDER — HYDROMORPHONE HYDROCHLORIDE 2 MG/ML
0.2 INJECTION, SOLUTION INTRAMUSCULAR; INTRAVENOUS; SUBCUTANEOUS EVERY 5 MIN PRN
Status: DISCONTINUED | OUTPATIENT
Start: 2017-06-13 | End: 2017-06-13 | Stop reason: HOSPADM

## 2017-06-13 RX ORDER — ONDANSETRON 2 MG/ML
4 INJECTION INTRAMUSCULAR; INTRAVENOUS EVERY 12 HOURS PRN
Status: DISCONTINUED | OUTPATIENT
Start: 2017-06-13 | End: 2017-06-16 | Stop reason: HOSPADM

## 2017-06-13 RX ORDER — ALVIMOPAN 12 MG/1
12 CAPSULE ORAL 2 TIMES DAILY
Status: COMPLETED | OUTPATIENT
Start: 2017-06-14 | End: 2017-06-15

## 2017-06-13 RX ORDER — OXYCODONE HYDROCHLORIDE 5 MG/1
5 TABLET ORAL
Status: DISCONTINUED | OUTPATIENT
Start: 2017-06-13 | End: 2017-06-13 | Stop reason: HOSPADM

## 2017-06-13 RX ORDER — ROCURONIUM BROMIDE 10 MG/ML
INJECTION, SOLUTION INTRAVENOUS
Status: DISCONTINUED | OUTPATIENT
Start: 2017-06-13 | End: 2017-06-13

## 2017-06-13 RX ORDER — FENTANYL CITRATE 50 UG/ML
INJECTION, SOLUTION INTRAMUSCULAR; INTRAVENOUS
Status: DISCONTINUED | OUTPATIENT
Start: 2017-06-13 | End: 2017-06-13

## 2017-06-13 RX ORDER — LIDOCAINE HYDROCHLORIDE 10 MG/ML
1 INJECTION, SOLUTION EPIDURAL; INFILTRATION; INTRACAUDAL; PERINEURAL ONCE
Status: DISCONTINUED | OUTPATIENT
Start: 2017-06-13 | End: 2017-06-13

## 2017-06-13 RX ORDER — INDOCYANINE GREEN AND WATER 25 MG
5 KIT INJECTION ONCE
Status: COMPLETED | OUTPATIENT
Start: 2017-06-13 | End: 2017-06-13

## 2017-06-13 RX ADMIN — ALVIMOPAN 12 MG: 12 CAPSULE ORAL at 09:06

## 2017-06-13 RX ADMIN — ACETAMINOPHEN 1000 MG: 10 INJECTION, SOLUTION INTRAVENOUS at 10:06

## 2017-06-13 RX ADMIN — ONDANSETRON 4 MG: 2 INJECTION, SOLUTION INTRAMUSCULAR; INTRAVENOUS at 10:06

## 2017-06-13 RX ADMIN — GENTAMICIN SULFATE 276 MG: 80 INJECTION, SOLUTION INTRAVENOUS at 10:06

## 2017-06-13 RX ADMIN — LIDOCAINE HYDROCHLORIDE 40 MG: 20 INJECTION, SOLUTION EPIDURAL; INFILTRATION; INTRACAUDAL; PERINEURAL at 10:06

## 2017-06-13 RX ADMIN — FENTANYL CITRATE 50 MCG: 50 INJECTION, SOLUTION INTRAMUSCULAR; INTRAVENOUS at 10:06

## 2017-06-13 RX ADMIN — FENTANYL CITRATE 50 MCG: 50 INJECTION, SOLUTION INTRAMUSCULAR; INTRAVENOUS at 03:06

## 2017-06-13 RX ADMIN — HYDROMORPHONE HYDROCHLORIDE 0.2 MG: 2 INJECTION, SOLUTION INTRAMUSCULAR; INTRAVENOUS; SUBCUTANEOUS at 03:06

## 2017-06-13 RX ADMIN — PROPOFOL 130 MG: 10 INJECTION, EMULSION INTRAVENOUS at 10:06

## 2017-06-13 RX ADMIN — ROCURONIUM BROMIDE 30 MG: 10 INJECTION, SOLUTION INTRAVENOUS at 10:06

## 2017-06-13 RX ADMIN — MIDAZOLAM HYDROCHLORIDE 2 MG: 1 INJECTION, SOLUTION INTRAMUSCULAR; INTRAVENOUS at 10:06

## 2017-06-13 RX ADMIN — FENTANYL CITRATE 50 MCG: 50 INJECTION, SOLUTION INTRAMUSCULAR; INTRAVENOUS at 02:06

## 2017-06-13 RX ADMIN — SODIUM CHLORIDE, SODIUM LACTATE, POTASSIUM CHLORIDE, AND CALCIUM CHLORIDE: 600; 310; 30; 20 INJECTION, SOLUTION INTRAVENOUS at 10:06

## 2017-06-13 RX ADMIN — ROCURONIUM BROMIDE 10 MG: 10 INJECTION, SOLUTION INTRAVENOUS at 11:06

## 2017-06-13 RX ADMIN — INDOCYANINE GREEN AND WATER 2.5 MG: KIT at 12:06

## 2017-06-13 RX ADMIN — Medication: at 03:06

## 2017-06-13 RX ADMIN — SODIUM CHLORIDE, SODIUM LACTATE, POTASSIUM CHLORIDE, AND CALCIUM CHLORIDE: 600; 310; 30; 20 INJECTION, SOLUTION INTRAVENOUS at 02:06

## 2017-06-13 RX ADMIN — METRONIDAZOLE 500 MG: 500 INJECTION, SOLUTION INTRAVENOUS at 06:06

## 2017-06-13 RX ADMIN — METRONIDAZOLE 500 MG: 500 SOLUTION INTRAVENOUS at 10:06

## 2017-06-13 RX ADMIN — ACETAMINOPHEN 1000 MG: 10 INJECTION, SOLUTION INTRAVENOUS at 07:06

## 2017-06-13 RX ADMIN — SODIUM CHLORIDE, SODIUM LACTATE, POTASSIUM CHLORIDE, AND CALCIUM CHLORIDE: .6; .31; .03; .02 INJECTION, SOLUTION INTRAVENOUS at 06:06

## 2017-06-13 NOTE — ANESTHESIA POSTPROCEDURE EVALUATION
Anesthesia Post Evaluation    Patient: Macie Charles    Procedure(s) Performed: Procedure(s) (LRB):  ROBOTIC ASSISTED COLECTOMY-LAPAROSCOPIC (N/A)    Final Anesthesia Type: general  Patient location during evaluation: PACU  Patient participation: Yes- Able to Participate  Level of consciousness: awake and alert  Post-procedure vital signs: reviewed and stable  Pain management: adequate  Airway patency: patent  PONV status at discharge: No PONV  Anesthetic complications: no      Cardiovascular status: blood pressure returned to baseline  Respiratory status: unassisted  Hydration status: euvolemic  Follow-up not needed.        Visit Vitals  BP (!) 102/56   Pulse 78   Temp 36.4 °C (97.5 °F) (Temporal)   Resp (!) 6   Ht 5' (1.524 m)   Wt 54.5 kg (120 lb 2.4 oz)   SpO2 97%   Breastfeeding? No   BMI 23.47 kg/m²       Pain/Alessio Score: Pain Assessment Performed: Yes (6/13/2017  8:31 AM)  Presence of Pain: complains of pain/discomfort (6/13/2017  3:30 PM)  Pain Rating Prior to Med Admin: 6 (6/13/2017  3:56 PM)  Alessio Score: 8 (6/13/2017  3:30 PM)

## 2017-06-13 NOTE — TRANSFER OF CARE
Anesthesia Transfer of Care Note    Patient: Macie Charles    Procedure(s) Performed: Procedure(s) (LRB):  ROBOTIC ASSISTED COLECTOMY-LAPAROSCOPIC (N/A)    Patient location: PACU    Anesthesia Type: general    Transport from OR: Transported from OR on room air with adequate spontaneous ventilation    Post pain: adequate analgesia    Post assessment: no apparent anesthetic complications    Post vital signs: stable    Level of consciousness: awake, alert and oriented    Nausea/Vomiting: no nausea/vomiting    Complications: none    Transfer of care protocol was followed      Last vitals:   Visit Vitals  /63   Pulse 78   Temp 36.4 °C (97.5 °F) (Temporal)   Resp 15   Ht 5' (1.524 m)   Wt 54.5 kg (120 lb 2.4 oz)   SpO2 99%   Breastfeeding? No   BMI 23.47 kg/m²

## 2017-06-13 NOTE — ANESTHESIA PREPROCEDURE EVALUATION
06/13/2017  Macie Charles is a 71 y.o., female.    Anesthesia Evaluation    I have reviewed the Patient Summary Reports.    I have reviewed the Nursing Notes.   I have reviewed the Medications.     Review of Systems  Anesthesia Hx:  Hx of Anesthetic complications  Denies Family Hx of Anesthesia complications.   Denies Personal Hx of Anesthesia complications.   Cardiovascular:  Cardiovascular Normal     Pulmonary:  Pulmonary Normal    Endocrine:   Diabetes, type 2    Psych:   Psychiatric History          Physical Exam  General:  Well nourished    Airway/Jaw/Neck:  Airway Findings: Mallampati: II     Dental:  DENTAL FINDINGS: Normal   Chest/Lungs:  Chest/Lungs Findings: Normal Respiratory Rate     Heart/Vascular:  Heart Findings: Normal            Anesthesia Plan  Type of Anesthesia, risks & benefits discussed:  Anesthesia Type:  general  Patient's Preference:   Intra-op Monitoring Plan:   Intra-op Monitoring Plan Comments:   Post Op Pain Control Plan:   Post Op Pain Control Plan Comments:   Induction:   IV  Beta Blocker:  Patient is not currently on a Beta-Blocker (No further documentation required).       Informed Consent: Patient understands risks and agrees with Anesthesia plan.  Questions answered. Anesthesia consent signed with patient.  ASA Score: 2     Day of Surgery Review of History & Physical: I have interviewed and examined the patient. I have reviewed the patient's H&P dated:  There are no significant changes.          Ready For Surgery From Anesthesia Perspective.

## 2017-06-13 NOTE — H&P
History & Physical     SUBJECTIVE:      History of Present Illness:    Patient presents for surgery today. No changes since clinic.      Patient is a 71 y.o. female referred for numerous descending colon polyps and discussion for partial colectomy.  The patient recently underwent colonoscopy which showed numerous carpet-like polyps of her ascending colon with some small sessile polyps in her transverse colon.  The biopsies are still pending.  It was discussed with the patient that due to the nature of these polyps and the number of them it would be impossible to continue surveillance with colonoscopies.  She denies any family history of colon cancer.  She denies any weight loss or change in bowels.  She has had chronic constipation for years although she has bowel movements every day she continually feels like she is bloated.  She has tried multiple medications including fiber stool softeners and laxatives and is currently looking into further medications with GI.         Chief Complaint   Patient presents with    Consult              Review of patient's allergies indicates:   Allergen Reactions    Ceftin [cefuroxime axetil] Rash    Ciprofloxacin Rash    Morphine Rash    Pcn [penicillins] Rash          Current Medications           Current Outpatient Prescriptions   Medication Sig Dispense Refill    alprazolam (XANAX) 1 MG tablet TK 1 T PO BID   5    doxepin (SINEQUAN) 10 MG capsule Take 10 mg by mouth every evening.        estradiol (ESTRACE) 1 MG tablet TK 1 T PO QD   5    furosemide (LASIX) 20 MG tablet TK 1 T PO QD   5    gabapentin (NEURONTIN) 100 MG capsule          hydrocodone-acetaminophen 7.5-325mg (NORCO) 7.5-325 mg per tablet TK 1 T PO Q 6 TO 8 H PRN P   0    ibandronate (BONIVA) 150 mg tablet TK 1 T PO Q MONTH   5    promethazine (PHENERGAN) 25 MG tablet Take 1 tablet (25 mg total) by mouth every 6 (six) hours as needed for Nausea. 6 tablet 0    metronidazole (FLAGYL) 500 MG tablet Take 1  tablet (500 mg total) by mouth as needed (see directions). Take 1 tablet at 1 pm, 2pm, and 11pm the day prior to surgery 3 tablet 0    neomycin (MYCIFRADIN) 500 mg Tab Take 1 tablet (500 mg total) by mouth as needed (see instructions). Take 2 tablets at 1 pm, 2pm, and 11pm the day prior to surgery 6 tablet 0    polyethylene glycol (GOLYTELY,NULYTELY) 236-22.74-6.74 -5.86 gram suspension Take 4,000 mLs (4 L total) by mouth once. 4000 mL 0      No current facility-administered medications for this visit.                  Past Medical History:   Diagnosis Date    Anemia      Diabetes mellitus      Periodic limb movement disorder              Past Surgical History:   Procedure Laterality Date    APPENDECTOMY        bladder hydrodistinction         COLONOSCOPY N/A 3/15/2017     Procedure: COLONOSCOPY;  Surgeon: Jonathan Valverde MD;  Location: [facility];  Service: Endoscopy;  Laterality: N/A;    COLONOSCOPY N/A 3/14/2017     Procedure: COLONOSCOPY;  Surgeon: Eder Calvo MD;  Location: [facility];  Service: Endoscopy;  Laterality: N/A;    COLONOSCOPY N/A 4/25/2017     Procedure: COLONOSCOPY;  Surgeon: Jonathan Valverde MD;  Location: [facility];  Service: Endoscopy;  Laterality: N/A;    HYSTERECTOMY        TUBAL LIGATION                Family History   Problem Relation Age of Onset    Heart disease Mother      Heart disease Father                Social History    Substance Use Topics     Smoking status: Never Smoker     Smokeless tobacco: Never Used     Alcohol use Yes         Comment: 1 marguerita monthly          Review of Systems:  Review of Systems   Constitutional: Negative for activity change, appetite change, chills, fever and unexpected weight change.   HENT: Negative for congestion.    Eyes: Negative for visual disturbance.   Respiratory: Negative for cough and shortness of breath.    Cardiovascular: Negative for chest pain, palpitations and leg swelling.   Gastrointestinal: Positive for  constipation. Negative for abdominal distention, abdominal pain, diarrhea, nausea and vomiting.   Endocrine: Negative for polyuria.   Genitourinary: Negative for dysuria.   Musculoskeletal: Positive for back pain.   Skin: Negative for rash.   Neurological: Negative for dizziness and light-headedness.   Hematological: Negative for adenopathy.         OBJECTIVE:      Vital Signs (Most Recent)  Temp: 98.6 °F (37 °C) (05/03/17 1414)  Pulse: 91 (05/03/17 1414)  BP: (!) 140/72 (05/03/17 1414)     55.3 kg (122 lb)      Physical Exam:  Physical Exam   Constitutional: She is oriented to person, place, and time. She appears well-developed and well-nourished.   HENT:   Head: Normocephalic and atraumatic.   Eyes: EOM are normal.   Neck: Neck supple.   Cardiovascular: Normal rate and regular rhythm.    Pulmonary/Chest: Effort normal and breath sounds normal.   Abdominal: Soft. Bowel sounds are normal. She exhibits no distension. There is no tenderness.   Well-healed lower midline scar   Neurological: She is alert and oriented to person, place, and time.   Skin: Skin is warm and dry.   Vitals reviewed.     Colonoscopy:  Findings:       The perianal exam findings include non-thrombosed external        hemorrhoids.       Multiple carpet-like and flat polyps were found in the ascending        colon and cecum. Too numerous to count. The polyps were 1 to 5 mm in        size. These polyps were removed with a cold biopsy forceps.        Resection was complete, but the polyp tissue was only partially        retrieved.       Six sessile polyps were found in the transverse colon. The polyps        were 2 to 3 mm in size. These polyps were removed with a cold biopsy        forceps. Resection and retrieval were complete.       Non-bleeding internal hemorrhoids were found during retroflexion.        The hemorrhoids were small.        ASSESSMENT/PLAN:      71-year-old female with numerous carpet-like polyps of the descending  colon     PLAN:Plan     Ok to proceed with surgery     Robotic right hemicolectomy possible lap, possible open 6/13/17 Dr. Gr to assist  Preoperative: CBC, CMP, chest x-ray, EKG.  Bowel prep the night prior to surgery  Discussed with patient risk and benefits of surgery including: Pain, bleeding, infection, risk of injury to underlying organs, stricture or leak from anastomosis, possible open surgery  Patient understands that due to difficult screening surveillance would be impossible.  She understands the risks and agrees to proceed with surgery

## 2017-06-14 LAB
ANION GAP SERPL CALC-SCNC: 9 MMOL/L
BASOPHILS # BLD AUTO: 0.02 K/UL
BASOPHILS NFR BLD: 0.2 %
BUN SERPL-MCNC: 5 MG/DL
CALCIUM SERPL-MCNC: 8.6 MG/DL
CHLORIDE SERPL-SCNC: 104 MMOL/L
CO2 SERPL-SCNC: 25 MMOL/L
CREAT SERPL-MCNC: 0.7 MG/DL
DIFFERENTIAL METHOD: ABNORMAL
EOSINOPHIL # BLD AUTO: 0 K/UL
EOSINOPHIL NFR BLD: 0.2 %
ERYTHROCYTE [DISTWIDTH] IN BLOOD BY AUTOMATED COUNT: 13.8 %
EST. GFR  (AFRICAN AMERICAN): >60 ML/MIN/1.73 M^2
EST. GFR  (NON AFRICAN AMERICAN): >60 ML/MIN/1.73 M^2
GLUCOSE SERPL-MCNC: 98 MG/DL
HCT VFR BLD AUTO: 36.6 %
HGB BLD-MCNC: 12 G/DL
LYMPHOCYTES # BLD AUTO: 1.4 K/UL
LYMPHOCYTES NFR BLD: 10.7 %
MCH RBC QN AUTO: 29.5 PG
MCHC RBC AUTO-ENTMCNC: 32.8 %
MCV RBC AUTO: 90 FL
MONOCYTES # BLD AUTO: 0.9 K/UL
MONOCYTES NFR BLD: 6.6 %
NEUTROPHILS # BLD AUTO: 10.8 K/UL
NEUTROPHILS NFR BLD: 82.3 %
PLATELET # BLD AUTO: 245 K/UL
PMV BLD AUTO: 10.7 FL
POCT GLUCOSE: 98 MG/DL (ref 70–110)
POTASSIUM SERPL-SCNC: 4 MMOL/L
RBC # BLD AUTO: 4.07 M/UL
SODIUM SERPL-SCNC: 138 MMOL/L
WBC # BLD AUTO: 13.1 K/UL

## 2017-06-14 PROCEDURE — G8979 MOBILITY GOAL STATUS: HCPCS | Mod: CJ

## 2017-06-14 PROCEDURE — 11000001 HC ACUTE MED/SURG PRIVATE ROOM

## 2017-06-14 PROCEDURE — 97116 GAIT TRAINING THERAPY: CPT

## 2017-06-14 PROCEDURE — 25000003 PHARM REV CODE 250: Performed by: PHYSICIAN ASSISTANT

## 2017-06-14 PROCEDURE — 80048 BASIC METABOLIC PNL TOTAL CA: CPT

## 2017-06-14 PROCEDURE — 63600175 PHARM REV CODE 636 W HCPCS: Performed by: PHYSICIAN ASSISTANT

## 2017-06-14 PROCEDURE — 97162 PT EVAL MOD COMPLEX 30 MIN: CPT

## 2017-06-14 PROCEDURE — 36415 COLL VENOUS BLD VENIPUNCTURE: CPT

## 2017-06-14 PROCEDURE — 25000003 PHARM REV CODE 250: Performed by: SURGERY

## 2017-06-14 PROCEDURE — 85025 COMPLETE CBC W/AUTO DIFF WBC: CPT

## 2017-06-14 PROCEDURE — 63600175 PHARM REV CODE 636 W HCPCS: Performed by: SURGERY

## 2017-06-14 PROCEDURE — 99900035 HC TECH TIME PER 15 MIN (STAT)

## 2017-06-14 PROCEDURE — G8978 MOBILITY CURRENT STATUS: HCPCS | Mod: CK

## 2017-06-14 PROCEDURE — 94799 UNLISTED PULMONARY SVC/PX: CPT

## 2017-06-14 PROCEDURE — C9113 INJ PANTOPRAZOLE SODIUM, VIA: HCPCS | Performed by: SURGERY

## 2017-06-14 RX ORDER — MORPHINE SULFATE 2 MG/ML
2 INJECTION, SOLUTION INTRAMUSCULAR; INTRAVENOUS EVERY 4 HOURS PRN
Status: DISCONTINUED | OUTPATIENT
Start: 2017-06-14 | End: 2017-06-14

## 2017-06-14 RX ORDER — OXYCODONE HYDROCHLORIDE 5 MG/1
10 TABLET ORAL EVERY 4 HOURS PRN
Status: DISCONTINUED | OUTPATIENT
Start: 2017-06-14 | End: 2017-06-15

## 2017-06-14 RX ORDER — OXYCODONE HYDROCHLORIDE 5 MG/1
5 TABLET ORAL EVERY 6 HOURS PRN
Status: DISCONTINUED | OUTPATIENT
Start: 2017-06-14 | End: 2017-06-14

## 2017-06-14 RX ORDER — HYDROMORPHONE HYDROCHLORIDE 1 MG/ML
0.2 INJECTION, SOLUTION INTRAMUSCULAR; INTRAVENOUS; SUBCUTANEOUS EVERY 6 HOURS PRN
Status: DISCONTINUED | OUTPATIENT
Start: 2017-06-14 | End: 2017-06-14

## 2017-06-14 RX ORDER — DEXTROSE MONOHYDRATE, SODIUM CHLORIDE, AND POTASSIUM CHLORIDE 50; 1.49; 4.5 G/1000ML; G/1000ML; G/1000ML
INJECTION, SOLUTION INTRAVENOUS CONTINUOUS
Status: DISCONTINUED | OUTPATIENT
Start: 2017-06-14 | End: 2017-06-15

## 2017-06-14 RX ORDER — HYDROMORPHONE HYDROCHLORIDE 1 MG/ML
0.5 INJECTION, SOLUTION INTRAMUSCULAR; INTRAVENOUS; SUBCUTANEOUS
Status: DISCONTINUED | OUTPATIENT
Start: 2017-06-14 | End: 2017-06-15

## 2017-06-14 RX ORDER — HYDROMORPHONE HYDROCHLORIDE 1 MG/ML
1 INJECTION, SOLUTION INTRAMUSCULAR; INTRAVENOUS; SUBCUTANEOUS ONCE
Status: COMPLETED | OUTPATIENT
Start: 2017-06-14 | End: 2017-06-14

## 2017-06-14 RX ADMIN — DEXTROSE MONOHYDRATE, SODIUM CHLORIDE, AND POTASSIUM CHLORIDE: 50; 4.5; 1.49 INJECTION, SOLUTION INTRAVENOUS at 08:06

## 2017-06-14 RX ADMIN — METRONIDAZOLE 500 MG: 500 INJECTION, SOLUTION INTRAVENOUS at 01:06

## 2017-06-14 RX ADMIN — ALVIMOPAN 12 MG: 12 CAPSULE ORAL at 08:06

## 2017-06-14 RX ADMIN — DIPHENHYDRAMINE HYDROCHLORIDE 25 MG: 50 INJECTION, SOLUTION INTRAMUSCULAR; INTRAVENOUS at 08:06

## 2017-06-14 RX ADMIN — ACETAMINOPHEN 1000 MG: 10 INJECTION, SOLUTION INTRAVENOUS at 01:06

## 2017-06-14 RX ADMIN — PANTOPRAZOLE SODIUM 40 MG: 40 INJECTION, POWDER, FOR SOLUTION INTRAVENOUS at 05:06

## 2017-06-14 RX ADMIN — HYDROMORPHONE HYDROCHLORIDE 1 MG: 1 INJECTION, SOLUTION INTRAMUSCULAR; INTRAVENOUS; SUBCUTANEOUS at 07:06

## 2017-06-14 RX ADMIN — ONDANSETRON 4 MG: 2 INJECTION INTRAMUSCULAR; INTRAVENOUS at 10:06

## 2017-06-14 RX ADMIN — ACETAMINOPHEN 1000 MG: 10 INJECTION, SOLUTION INTRAVENOUS at 05:06

## 2017-06-14 RX ADMIN — SODIUM CHLORIDE, SODIUM LACTATE, POTASSIUM CHLORIDE, AND CALCIUM CHLORIDE: .6; .31; .03; .02 INJECTION, SOLUTION INTRAVENOUS at 01:06

## 2017-06-14 RX ADMIN — HYDROMORPHONE HYDROCHLORIDE 0.2 MG: 1 INJECTION, SOLUTION INTRAMUSCULAR; INTRAVENOUS; SUBCUTANEOUS at 04:06

## 2017-06-14 RX ADMIN — Medication: at 10:06

## 2017-06-14 RX ADMIN — OXYCODONE HYDROCHLORIDE 10 MG: 5 TABLET ORAL at 11:06

## 2017-06-14 RX ADMIN — PROMETHAZINE HYDROCHLORIDE 6.25 MG: 25 INJECTION INTRAMUSCULAR; INTRAVENOUS at 08:06

## 2017-06-14 RX ADMIN — PROMETHAZINE HYDROCHLORIDE 6.25 MG: 25 INJECTION INTRAMUSCULAR; INTRAVENOUS at 02:06

## 2017-06-14 RX ADMIN — ALVIMOPAN 12 MG: 12 CAPSULE ORAL at 09:06

## 2017-06-14 RX ADMIN — OXYCODONE HYDROCHLORIDE 5 MG: 5 TABLET ORAL at 01:06

## 2017-06-14 NOTE — OP NOTE
[facility]  Surgery Department  Operative Note    SUMMARY     Date of Procedure: 6/13/2017     Procedure: Procedure(s) (LRB):  ROBOTIC ASSISTED COLECTOMY-LAPAROSCOPIC (right)     Surgeon(s) and Role:     * Haroldo Robles MD - Primary     * Andry Gr MD - Assisting        Pre-Operative Diagnosis: Polyp of ascending colon, unspecified type [D12.2]    Post-Operative Diagnosis: Post-Op Diagnosis Codes:     * Polyp of ascending colon, unspecified type [D12.2]    Anesthesia: General    Technical Procedures Used: robotic right hemicolectomy    Description of the Findings of the Procedure: right hemicolectomy with intracorporeal anastamosis    Complications: No    Estimated Blood Loss (EBL): 15 mL           Implants: * No implants in log *    Specimens:   Specimen (12h ago through future)    Start     Ordered    06/13/17 1427  Specimen to Pathology - Surgery  Once     Comments:  Right colonDX: Colon Polyp      06/13/17 1428                  Condition: Good    Disposition: PACU - hemodynamically stable.    Procedure in Detail:    She was brought in the operating room placed on the operating table supine position.  General endotracheal anesthesia was induced.  IV antibiotics were administered.  Pneumatic compression devices were placed on the lower extremity.  Her arms were tucked at her sides.  The abdomen was prepped and draped in the standard fashion.     A timeout was performed.     A small incision was made in the upper quadrant just below the costal margin.  The fascia was elevated with Pat clamps.  A varies needle was inserted and pneumoperitoneum was established to 15 mmHg.  An 8 mm robotic trocar was then inserted.  The laparoscope was inserted and the was no experience of visceral or vascular injury     Additional trochars were inserted on the left side as follows an 8 mm in the mid axillary line just above the umbilicus, an 8mm in the mid x-ray line below the umbilicus and a 12 mm in the  left lower quadrant.  Patient was placed in slight Trendelenburg position and rolled to her left.  She was docked to the operating robot.     Adhesions between the ascending and transverse colon were taken down.  We then elevated the cecum and identified the ileocolic pedicle.  This was dissected circumferentially.  The ileal conduit pedicle was then elevated and the retroperitoneal space beneath it was dissected using the hook cautery.  Dissection was carried superiorly until the duodenum was identified and then laterally until a clear space inferiorly and lateral to the ascending colon was identified.     The terminal ileum was then elevated and the peritoneum scored to the point of transection.  The mesentery was then freed from its retroperitoneal attachments and taken down with the vessel sealing device to the level of the terminal ileum.     Attention was then turned to the transverse colon in the appropriate place for transection was identified.  The mesentery was then taken down with the vessel sealer and the colon dissected circumferentially.     The transverse colon was transected with 2 firings of the robotic stapler using intestinal load.  The terminal ileum was then transected with one firing of the robotic stapler using a intestinal load.  The mesial colic vessels were taken down with a single firing of the stapler using a vascular load.     The paracolic ligament was then taken down using the vessel sealer.  As the colon was freed the remainder of the mesentery was taken down.  Once the colon was completely freed it was placed above the liver.     The terminal ileum was inspected and vasculature appeared good.     The colon and small bowel were aligned in a isoperistaltic manner.  A 2-0 silk suture was placed near the transected   end of the colon along the edge of the tenia and this was up used to approximate the small bowel to the colon in this area.  This suture was then secured to the lateral  abdominal wall.     An additional suture was placed.the transected end of the small bowel and through the same area along the tenia of the colon.     The patient was given indocyan green again green in the firefly technology was used.  Both the small bowel and colonic limb showed excellent blood flow with no evidence of ischemia     A side to side anastomosis was carried out by making enterotomies in the transverse colon and the small bowel using the hook cautery.  The side to side anastomosis was then carried out by icing one limb of the robotic stapler into the transverse colon, the other limb into the small intestine.  The side to side anastomosis was then created by firing the stapler.     The opening between the colon and the small bowel was closed with an internal layer of 2-0 stratafix suture in a running fashion.  The suture lines were then reinforced with 3-0 silk in a Lembert fashion.     The suture used to hold the colon and small bowel to the lateral abdominal wall was then transected to allow the colon and small bowel to fall back into the right lateral abdominal cavity      A wound protector was inserted.  The resected ileum/ascending colon (right colon)  Was then removed, and passed off the operative field.     The peritoneum was closed with o-vicryl in a running fashion.  The external fascia was closed with #1 PDS in a running fashion.      The deep layers of this incision was closed with 3-0 Vicryl.  The remaining skin incisions were then closed with 4-0 Monocryl.  Dermaflex was placed over the incisions.     Patient hard the procedure well and sponges counts were correct

## 2017-06-14 NOTE — ASSESSMENT & PLAN NOTE
S/p robotic right hemicolectomy  Post op day1: start clear liquids  Stop PCA and switch to PO meds with IV for breakthrough  Ambulate, IS

## 2017-06-14 NOTE — PROGRESS NOTES
[facility]  General Surgery  Progress Note    Subjective:     History of Present Illness:  No notes on file    Post-Op Info:  Procedure(s) (LRB):  ROBOTIC ASSISTED COLECTOMY-LAPAROSCOPIC (N/A)   1 Day Post-Op     Interval History: no complaints, pain controlled. No nausea    Medications:  Continuous Infusions:   hydromorphone in 0.9 % NaCl 6 mg/30 ml      lactated ringers 100 mL/hr at 06/13/17 1807     Scheduled Meds:   acetaminophen  1,000 mg Intravenous Q8H    alvimopan  12 mg Oral BID    pantoprazole  40 mg Intravenous Before breakfast     PRN Meds:dextrose 50%, diphenhydrAMINE, glucagon (human recombinant), insulin aspart, naloxone, ondansetron, promethazine (PHENERGAN) IVPB     Review of patient's allergies indicates:   Allergen Reactions    Ceftin [cefuroxime axetil] Rash    Ciprofloxacin Rash    Morphine Rash    Pcn [penicillins] Rash    Poison ivy extract Rash     Objective:     Vital Signs (Most Recent):  Temp: 98.8 °F (37.1 °C) (06/14/17 0700)  Pulse: 80 (06/14/17 0700)  Resp: 11 (06/14/17 0700)  BP: 126/64 (06/14/17 0700)  SpO2: 100 % (06/14/17 0700) Vital Signs (24h Range):  Temp:  [97.4 °F (36.3 °C)-99.3 °F (37.4 °C)] 98.8 °F (37.1 °C)  Pulse:  [67-97] 80  Resp:  [6-16] 11  SpO2:  [97 %-100 %] 100 %  BP: (102-126)/(56-72) 126/64     Weight: 54.5 kg (120 lb 2.4 oz)  Body mass index is 23.47 kg/m².    Intake/Output - Last 3 Shifts       06/12 0700 - 06/13 0659 06/13 0700 - 06/14 0659 06/14 0700 - 06/15 0659    P.O.  0     I.V. (mL/kg)  3619 (66.4) 9.6 (0.2)    IV Piggyback  200     Total Intake(mL/kg)  3819 (70.1) 9.6 (0.2)    Urine (mL/kg/hr)  1860     Stool  0     Blood  15     Total Output   1875      Net   +1944 +9.6           Stool Occurrence  0 x           Physical Exam   Constitutional: She is oriented to person, place, and time. She appears well-developed and well-nourished.   HENT:   Head: Normocephalic and atraumatic.   Eyes: EOM are normal.   Cardiovascular: Normal  rate and regular rhythm.    Pulmonary/Chest: Effort normal and breath sounds normal. No respiratory distress.   Abdominal: Soft. She exhibits no distension. There is tenderness (incisional).   Musculoskeletal: Normal range of motion.   Neurological: She is alert and oriented to person, place, and time.   Skin: Skin is warm and dry.   Psychiatric: She has a normal mood and affect. Thought content normal.   Vitals reviewed.      Significant Labs:  CBC:   Recent Labs  Lab 06/14/17  0534   WBC 13.10*   RBC 4.07   HGB 12.0   HCT 36.6*      MCV 90   MCH 29.5   MCHC 32.8     CMP:   Recent Labs  Lab 06/14/17  0534   GLU 98   CALCIUM 8.6*      K 4.0   CO2 25      BUN 5*   CREATININE 0.7       Significant Diagnostics:  NA    Assessment/Plan:     * Colon polyps    S/p robotic right hemicolectomy  Post op day1: start clear liquids  Stop PCA and switch to PO meds with IV for breakthrough  Ambulate, IS            Megan Peraza PA-C  General Surgery  [facility]

## 2017-06-14 NOTE — PLAN OF CARE
"Met with patient and patient's  in hospital room.  Patient states that she and  are retired and since her long term, patient has been challenged with various medical problems.  Patient reports that she is followed outpatient by PCP; Heme/Onc (Dr. Dias); Pain Management (Dr. Luke); and Psychiatrist (Dr. Silver) in [place].  Patient indicates that due to medical issues, she has experienced increased depression and anxiety and has been seeing psychiatrist x 6 months.  Patient denies having any past or current SI or suicide attempts.  Patient hopeful that following this surgery during this admission, some of her medical problems will improve.  Patient indicates having supportive family and anticipates having no post-hospitalization needs from a case management perspective.  Provided patient with "Discharge Planning Begins on Admission" brochure and this 's contact information should any needs arise.    PLAN:  Will continue to follow       06/14/17 8153   Discharge Assessment   Assessment Type Discharge Planning Assessment   Confirmed/corrected address and phone number on facesheet? Yes   Assessment information obtained from? Patient;Caregiver;Medical Record   Expected Length of Stay (days) (TBD)   Communicated expected length of stay with patient/caregiver no   Type of Healthcare Directive Received (Does not have AD)   Prior to hospitilization cognitive status: Alert/Oriented   Prior to hospitalization functional status: Independent   Current cognitive status: Alert/Oriented   Current Functional Status: Independent   Arrived From home or self-care   Lives With spouse   Able to Return to Prior Arrangements yes   Is patient able to care for self after discharge? Yes   How many people do you have in your home that can help with your care after discharge? 1   Who are your caregiver(s) and their phone number(s)? Sly Charles, :  [phone]   Patient's perception of " discharge disposition home or selfcare   Readmission Within The Last 30 Days no previous admission in last 30 days   Patient currently being followed by outpatient case management? No   Patient currently receives home health services? No   Does the patient currently use HME? No   Patient currently receives private duty nursing? No   Equipment Currently Used at Home none   Do you have any problems affording any of your prescribed medications? No   Is the patient taking medications as prescribed? yes   Do you have any financial concerns preventing you from receiving the healthcare you need? No   Does the patient have transportation to healthcare appointments? Yes   Transportation Available family or friend will provide;car   On Dialysis? No   Discharge Plan A Home with family   Discharge Plan B Home with family   Patient/Family In Agreement With Plan yes

## 2017-06-14 NOTE — NURSING
Patient assessed for diabetes educational needs following chart review  Daughter at bedside for info  She reports was diagnosed 30 years ago   Controls diabetes with diet and exercise  She does not identify any diabetes educational needs at this time  Glucoses are stable

## 2017-06-14 NOTE — PLAN OF CARE
Problem: Patient Care Overview  Goal: Plan of Care Review  PT REQUIRES MIN A FOR GT X 20' WITH RW   Reviewed plan of care with patient.  Patient verbalized understanding and teachback.      12h chart check completed.

## 2017-06-14 NOTE — PLAN OF CARE
Problem: Patient Care Overview  Goal: Plan of Care Review  Outcome: Ongoing (interventions implemented as appropriate)  Pt returned from PACU around 1740. Pt remains free from injury/falls. Fall precautions in place. Bed alarm on and functioning properly. Pt asleep but wakes up with her name beibg called. IV fluids infusing. Dilaudid PCA pump initiated in PACU. Pt's pain 4/10. Pt's lap sites clean dry and intact. Pt able to verbalize needs/wants. Bed in low, locked position. Call light and person items within reach. Family at bedside. VSS. Will cont to monitor.

## 2017-06-14 NOTE — HOSPITAL COURSE
S/p robot assisted laparoscopic right hemicolectomy.   Postop day 1: doing well. No nausea, pain controlled.   Postop day 2: c/o of pain, +flatus/BM; some nausea/-emesis

## 2017-06-14 NOTE — PT/OT/SLP EVAL
Physical Therapy  Evaluation    Macie Charles   MRN: [id number]   Admitting Diagnosis: Colon polyps    PT Received On: 06/14/17  PT Start Time: 0957     PT Stop Time: 1020    PT Total Time (min): 23 min       Billable Minutes:  Evaluation 13 and Gait [id number]    Diagnosis: Colon polyps  P.T. DX: GT INSTABILITY    Past Medical History:   Diagnosis Date    Anemia     Diabetes mellitus     diet controlled    Periodic limb movement disorder     PONV (postoperative nausea and vomiting)       Past Surgical History:   Procedure Laterality Date    APPENDECTOMY      bladder hydrodistinction       COLONOSCOPY N/A 3/15/2017    Procedure: COLONOSCOPY;  Surgeon: Jonathan Valverde MD;  Location[facility] ENDO;  Service: Endoscopy;  Laterality: N/A;    COLONOSCOPY N/A 3/14/2017    Procedure: COLONOSCOPY;  Surgeon: Eder Calvo MD;  Location: [facility];  Service: Endoscopy;  Laterality: N/A;    COLONOSCOPY N/A 4/25/2017    Procedure: COLONOSCOPY;  Surgeon: Jonathan Valverde MD;  Location: [facility];  Service: Endoscopy;  Laterality: N/A;    HYSTERECTOMY      TUBAL LIGATION         Referring physician: HAYLIE  Date referred to PT: 6/14/2017      General Precautions: Standard, fall  Orthopedic Precautions:     Braces:              Patient History:  Lives With: spouse  Living Arrangements: house  Home Accessibility: stairs to enter home  Number of Stairs to Enter Home: 3  Stair Railings at Home: none  Living Environment Comment: PT LIVES  AT HOME WITH  AND WAS IND AND DRIVING.   Equipment Currently Used at Home: none  DME owned (not currently used): none    Previous Level of Function:  Ambulation Skills: independent  Transfer Skills: independent  ADL Skills: independent  Work/Leisure Activity: independent    Subjective:  Communicated with NURSE RESHONDIALYN AND [facility] CHART REVIEW  prior to session.   PT AGREED TO EVAL AND TX   Chief Complaint:  PAIN   Patient goals:  GO HOME     Pain/Comfort  Pain Rating  1: 5/10  Location 1: abdomen      Objective:   Patient found with: oxygen, PCA, peripheral IV     Cognitive Exam:  Oriented to: Person, Place, Time and Situation    Follows Commands/attention: Follows multistep  commands  Communication: clear/fluent  Safety awareness/insight to disability: intact    Physical Exam:  Postural examination/scapula alignment: No postural abnormalities identified    Skin integrity: Visible skin intact  Edema: None noted     Sensation:   Intact    Upper Extremity Range of Motion:  Right Upper Extremity: WFL  Left Upper Extremity: WFL    Upper Extremity Strength:  Right Upper Extremity: WFL  Left Upper Extremity: WFL    Lower Extremity Range of Motion:  Right Lower Extremity: WFL  Left Lower Extremity: WFL    Lower Extremity Strength:  Right Lower Extremity: WFL  Left Lower Extremity: WFL      Functional Mobility:  Bed Mobility:  Rolling/Turning to Left: Minimum assistance  Supine to Sit: Minimum Assistance    Transfers:  Sit <> Stand Assistance: Contact Guard Assistance  Sit <> Stand Assistive Device: Rolling Walker  Bed <> Chair Technique: Stand Pivot  Bed <> Chair Assistance: Contact Guard Assistance  Bed <> Chair Assistive Device: Rolling Walker    Gait:   Gait Distance: PT GT TRAINED X 20' WITH RW AND CGA WITH SLOW PACE  Assistance 1: Contact Guard Assistance  Gait Assistive Device: Rolling walker  Gait Pattern: swing-to gait  Gait Deviation(s): decreased bryant, decreased weight-shifting ability    Therapeutic Activities and Exercises:   PT RETURNED TO  AFTER GT ASSESSMENT AND SEATED IN CHAIR WITH RW AND CGA. PT LEFT SEATED AND EDUCATED ON ROLE OF P.T. PT LEFT WITH ALL NEEDS MET     AM-PAC 6 CLICK MOBILITY  How much help from another person does this patient currently need?   1 = Unable, Total/Dependent Assistance  2 = A lot, Maximum/Moderate Assistance  3 = A little, Minimum/Contact Guard/Supervision  4 = None, Modified Bates/Independent    Turning over in bed (including  adjusting bedclothes, sheets and blankets)?: 3  Sitting down on and standing up from a chair with arms (e.g., wheelchair, bedside commode, etc.): 3  Moving from lying on back to sitting on the side of the bed?: 3  Moving to and from a bed to a chair (including a wheelchair)?: 3  Need to walk in hospital room?: 3  Climbing 3-5 steps with a railing?: 1  Total Score: 16     AM-PAC Raw Score CMS G-Code Modifier Level of Impairment Assistance   6 % Total / Unable   7 - 9 CM 80 - 100% Maximal Assist   10 - 14 CL 60 - 80% Moderate Assist   15 - 19 CK 40 - 60% Moderate Assist   20 - 22 CJ 20 - 40% Minimal Assist   23 CI 1-20% SBA / CGA   24 CH 0% Independent/ Mod I     Patient left up in chair with call button in reach.    Assessment:   Macie Charles is a 71 y.o. female with a medical diagnosis of Colon polyps and presents with GT INSTABILITY AND IMPAIRED GROSS FUNC MOBILITY LIMITED BY IMPAIRMENTS LISTED. PT WILL BENEFIT FROM P.T. TO ADDRESS IMPAIRMENTS.     Rehab identified problem list/impairments: Rehab identified problem list/impairments: weakness, impaired endurance, impaired balance, pain, gait instability, impaired functional mobilty    Rehab potential is excellent.    Activity tolerance: Good    Discharge recommendations: Discharge Facility/Level Of Care Needs: home     Barriers to discharge: Barriers to Discharge: None    Equipment recommendations: Equipment Needed After Discharge: walker, rolling (TBD)     GOALS:    Physical Therapy Goals        Problem: Physical Therapy Goal    Goal Priority Disciplines Outcome Goal Variances Interventions   Physical Therapy Goal     PT/OT, PT      Description:  PT WILL BE SEEN FOR P.T. FOR A MIN OF 5 OUT OF 7 DAYS A WEEK  LT17  1. PT WILL COMPLETE B LE TE X 20 REPS FOR STRENGTHENING.  2. PT WILL T/F TO CHAIR WITH RW AND S.   3. PT WILL GT TRAIN X 150' WITH OR WITHOUT RW AND S  4. PT WILL T/F TO CHAIR WITH RW AND S                      PLAN:    Patient to  be seen   to address the above listed problems via gait training, therapeutic activities, therapeutic exercises  Plan of Care expires: 06/21/17  Plan of Care reviewed with: patient    Functional Assessment Tool Used: fermin dutton pac  Score: ck  Functional Limitation: Mobility: Walking and moving around  Mobility: Walking and Moving Around Current Status (): CK  Mobility: Walking and Moving Around Goal Status (): JOAN Whyte, PT  06/14/2017

## 2017-06-14 NOTE — PLAN OF CARE
Problem: Patient Care Overview  Goal: Plan of Care Review  Outcome: Ongoing (interventions implemented as appropriate)  Respirations even and unlabored, no distress noted on assessment, pain, no nausea or shortness of breath, daughter at bedside, bed alarm, lap sites to abdomen with dermabond, dilaudid pca

## 2017-06-14 NOTE — SUBJECTIVE & OBJECTIVE
Interval History: no complaints, pain controlled. No nausea    Medications:  Continuous Infusions:   hydromorphone in 0.9 % NaCl 6 mg/30 ml      lactated ringers 100 mL/hr at 06/13/17 1807     Scheduled Meds:   acetaminophen  1,000 mg Intravenous Q8H    alvimopan  12 mg Oral BID    pantoprazole  40 mg Intravenous Before breakfast     PRN Meds:dextrose 50%, diphenhydrAMINE, glucagon (human recombinant), insulin aspart, naloxone, ondansetron, promethazine (PHENERGAN) IVPB     Review of patient's allergies indicates:   Allergen Reactions    Ceftin [cefuroxime axetil] Rash    Ciprofloxacin Rash    Morphine Rash    Pcn [penicillins] Rash    Poison ivy extract Rash     Objective:     Vital Signs (Most Recent):  Temp: 98.8 °F (37.1 °C) (06/14/17 0700)  Pulse: 80 (06/14/17 0700)  Resp: 11 (06/14/17 0700)  BP: 126/64 (06/14/17 0700)  SpO2: 100 % (06/14/17 0700) Vital Signs (24h Range):  Temp:  [97.4 °F (36.3 °C)-99.3 °F (37.4 °C)] 98.8 °F (37.1 °C)  Pulse:  [67-97] 80  Resp:  [6-16] 11  SpO2:  [97 %-100 %] 100 %  BP: (102-126)/(56-72) 126/64     Weight: 54.5 kg (120 lb 2.4 oz)  Body mass index is 23.47 kg/m².    Intake/Output - Last 3 Shifts       06/12 0700 - 06/13 0659 06/13 0700 - 06/14 0659 06/14 0700 - 06/15 0659    P.O.  0     I.V. (mL/kg)  3619 (66.4) 9.6 (0.2)    IV Piggyback  200     Total Intake(mL/kg)  3819 (70.1) 9.6 (0.2)    Urine (mL/kg/hr)  1860     Stool  0     Blood  15     Total Output   1875      Net   +1944 +9.6           Stool Occurrence  0 x           Physical Exam   Constitutional: She is oriented to person, place, and time. She appears well-developed and well-nourished.   HENT:   Head: Normocephalic and atraumatic.   Eyes: EOM are normal.   Cardiovascular: Normal rate and regular rhythm.    Pulmonary/Chest: Effort normal and breath sounds normal. No respiratory distress.   Abdominal: Soft. She exhibits no distension. There is tenderness (incisional).   Musculoskeletal: Normal range of  motion.   Neurological: She is alert and oriented to person, place, and time.   Skin: Skin is warm and dry.   Psychiatric: She has a normal mood and affect. Thought content normal.   Vitals reviewed.      Significant Labs:  CBC:   Recent Labs  Lab 06/14/17  0534   WBC 13.10*   RBC 4.07   HGB 12.0   HCT 36.6*      MCV 90   MCH 29.5   MCHC 32.8     CMP:   Recent Labs  Lab 06/14/17  0534   GLU 98   CALCIUM 8.6*      K 4.0   CO2 25      BUN 5*   CREATININE 0.7       Significant Diagnostics:  NA

## 2017-06-15 LAB
POCT GLUCOSE: 112 MG/DL (ref 70–110)
POCT GLUCOSE: 114 MG/DL (ref 70–110)
POCT GLUCOSE: 140 MG/DL (ref 70–110)
POCT GLUCOSE: 146 MG/DL (ref 70–110)
POCT GLUCOSE: 148 MG/DL (ref 70–110)
POCT GLUCOSE: 82 MG/DL (ref 70–110)

## 2017-06-15 PROCEDURE — 94799 UNLISTED PULMONARY SVC/PX: CPT

## 2017-06-15 PROCEDURE — C9113 INJ PANTOPRAZOLE SODIUM, VIA: HCPCS | Performed by: SURGERY

## 2017-06-15 PROCEDURE — 25000003 PHARM REV CODE 250: Performed by: SURGERY

## 2017-06-15 PROCEDURE — 97116 GAIT TRAINING THERAPY: CPT

## 2017-06-15 PROCEDURE — 63600175 PHARM REV CODE 636 W HCPCS: Performed by: SURGERY

## 2017-06-15 PROCEDURE — 97110 THERAPEUTIC EXERCISES: CPT

## 2017-06-15 PROCEDURE — 11000001 HC ACUTE MED/SURG PRIVATE ROOM

## 2017-06-15 PROCEDURE — 99900035 HC TECH TIME PER 15 MIN (STAT)

## 2017-06-15 RX ORDER — OXYCODONE HYDROCHLORIDE 5 MG/1
15 TABLET ORAL EVERY 4 HOURS PRN
Status: DISCONTINUED | OUTPATIENT
Start: 2017-06-15 | End: 2017-06-15

## 2017-06-15 RX ORDER — OXYCODONE AND ACETAMINOPHEN 10; 325 MG/1; MG/1
1 TABLET ORAL EVERY 4 HOURS PRN
Status: DISCONTINUED | OUTPATIENT
Start: 2017-06-15 | End: 2017-06-16 | Stop reason: HOSPADM

## 2017-06-15 RX ORDER — GABAPENTIN 100 MG/1
100 CAPSULE ORAL NIGHTLY
Status: DISCONTINUED | OUTPATIENT
Start: 2017-06-15 | End: 2017-06-16 | Stop reason: HOSPADM

## 2017-06-15 RX ORDER — IBUPROFEN 400 MG/1
400 TABLET ORAL EVERY 8 HOURS
Status: DISCONTINUED | OUTPATIENT
Start: 2017-06-15 | End: 2017-06-16 | Stop reason: HOSPADM

## 2017-06-15 RX ORDER — AMITRIPTYLINE HYDROCHLORIDE 10 MG/1
10 TABLET, FILM COATED ORAL 2 TIMES DAILY
Status: DISCONTINUED | OUTPATIENT
Start: 2017-06-15 | End: 2017-06-16 | Stop reason: HOSPADM

## 2017-06-15 RX ORDER — DOXEPIN HYDROCHLORIDE 10 MG/1
10 CAPSULE ORAL NIGHTLY
Status: DISCONTINUED | OUTPATIENT
Start: 2017-06-15 | End: 2017-06-16 | Stop reason: HOSPADM

## 2017-06-15 RX ORDER — FUROSEMIDE 20 MG/1
20 TABLET ORAL DAILY
Status: DISCONTINUED | OUTPATIENT
Start: 2017-06-16 | End: 2017-06-16 | Stop reason: HOSPADM

## 2017-06-15 RX ORDER — PERPHENAZINE 2 MG/1
2 TABLET ORAL DAILY
Status: DISCONTINUED | OUTPATIENT
Start: 2017-06-15 | End: 2017-06-16 | Stop reason: HOSPADM

## 2017-06-15 RX ORDER — ACETAMINOPHEN 325 MG/1
650 TABLET ORAL EVERY 6 HOURS PRN
Status: DISCONTINUED | OUTPATIENT
Start: 2017-06-15 | End: 2017-06-16 | Stop reason: HOSPADM

## 2017-06-15 RX ORDER — ESTRADIOL 1 MG/1
1 TABLET ORAL DAILY
Status: DISCONTINUED | OUTPATIENT
Start: 2017-06-16 | End: 2017-06-15

## 2017-06-15 RX ORDER — ENOXAPARIN SODIUM 100 MG/ML
40 INJECTION SUBCUTANEOUS EVERY 24 HOURS
Status: DISCONTINUED | OUTPATIENT
Start: 2017-06-15 | End: 2017-06-16 | Stop reason: HOSPADM

## 2017-06-15 RX ORDER — ESTRADIOL 1 MG/1
1 TABLET ORAL DAILY
Status: DISCONTINUED | OUTPATIENT
Start: 2017-06-15 | End: 2017-06-16 | Stop reason: HOSPADM

## 2017-06-15 RX ORDER — ALPRAZOLAM 1 MG/1
1 TABLET ORAL 3 TIMES DAILY PRN
Status: DISCONTINUED | OUTPATIENT
Start: 2017-06-15 | End: 2017-06-16 | Stop reason: HOSPADM

## 2017-06-15 RX ORDER — HYDROMORPHONE HYDROCHLORIDE 1 MG/ML
1 INJECTION, SOLUTION INTRAMUSCULAR; INTRAVENOUS; SUBCUTANEOUS
Status: DISCONTINUED | OUTPATIENT
Start: 2017-06-15 | End: 2017-06-16 | Stop reason: HOSPADM

## 2017-06-15 RX ORDER — OXYCODONE AND ACETAMINOPHEN 7.5; 325 MG/1; MG/1
2 TABLET ORAL EVERY 4 HOURS PRN
Status: DISCONTINUED | OUTPATIENT
Start: 2017-06-15 | End: 2017-06-16 | Stop reason: HOSPADM

## 2017-06-15 RX ADMIN — PANTOPRAZOLE SODIUM 40 MG: 40 INJECTION, POWDER, FOR SOLUTION INTRAVENOUS at 06:06

## 2017-06-15 RX ADMIN — ESTRADIOL 1 MG: 1 TABLET ORAL at 05:06

## 2017-06-15 RX ADMIN — IBUPROFEN 400 MG: 400 TABLET, FILM COATED ORAL at 05:06

## 2017-06-15 RX ADMIN — HYDROMORPHONE HYDROCHLORIDE 0.5 MG: 1 INJECTION, SOLUTION INTRAMUSCULAR; INTRAVENOUS; SUBCUTANEOUS at 12:06

## 2017-06-15 RX ADMIN — ALPRAZOLAM 1 MG: 1 TABLET ORAL at 07:06

## 2017-06-15 RX ADMIN — DOXEPIN HYDROCHLORIDE 10 MG: 10 CAPSULE ORAL at 09:06

## 2017-06-15 RX ADMIN — ENOXAPARIN SODIUM 40 MG: 100 INJECTION SUBCUTANEOUS at 08:06

## 2017-06-15 RX ADMIN — PROMETHAZINE HYDROCHLORIDE 6.25 MG: 25 INJECTION INTRAMUSCULAR; INTRAVENOUS at 04:06

## 2017-06-15 RX ADMIN — ONDANSETRON 4 MG: 2 INJECTION INTRAMUSCULAR; INTRAVENOUS at 11:06

## 2017-06-15 RX ADMIN — OXYCODONE HYDROCHLORIDE 10 MG: 5 TABLET ORAL at 04:06

## 2017-06-15 RX ADMIN — DIPHENHYDRAMINE HYDROCHLORIDE 25 MG: 50 INJECTION, SOLUTION INTRAMUSCULAR; INTRAVENOUS at 08:06

## 2017-06-15 RX ADMIN — HYDROMORPHONE HYDROCHLORIDE 1 MG: 1 INJECTION, SOLUTION INTRAMUSCULAR; INTRAVENOUS; SUBCUTANEOUS at 11:06

## 2017-06-15 RX ADMIN — HYDROMORPHONE HYDROCHLORIDE 0.5 MG: 1 INJECTION, SOLUTION INTRAMUSCULAR; INTRAVENOUS; SUBCUTANEOUS at 06:06

## 2017-06-15 RX ADMIN — ONDANSETRON 4 MG: 2 INJECTION INTRAMUSCULAR; INTRAVENOUS at 01:06

## 2017-06-15 RX ADMIN — PERPHENAZINE 2 MG: 2 TABLET, FILM COATED ORAL at 07:06

## 2017-06-15 RX ADMIN — ALVIMOPAN 12 MG: 12 CAPSULE ORAL at 08:06

## 2017-06-15 RX ADMIN — OXYCODONE HYDROCHLORIDE 15 MG: 5 TABLET ORAL at 03:06

## 2017-06-15 RX ADMIN — GABAPENTIN 100 MG: 100 CAPSULE ORAL at 09:06

## 2017-06-15 RX ADMIN — DEXTROSE MONOHYDRATE, SODIUM CHLORIDE, AND POTASSIUM CHLORIDE: 50; 4.5; 1.49 INJECTION, SOLUTION INTRAVENOUS at 08:06

## 2017-06-15 RX ADMIN — DIPHENHYDRAMINE HYDROCHLORIDE 25 MG: 50 INJECTION, SOLUTION INTRAMUSCULAR; INTRAVENOUS at 07:06

## 2017-06-15 RX ADMIN — OXYCODONE HYDROCHLORIDE 15 MG: 5 TABLET ORAL at 08:06

## 2017-06-15 RX ADMIN — ALPRAZOLAM 1 MG: 1 TABLET ORAL at 11:06

## 2017-06-15 RX ADMIN — ALVIMOPAN 12 MG: 12 CAPSULE ORAL at 09:06

## 2017-06-15 RX ADMIN — IBUPROFEN 400 MG: 400 TABLET, FILM COATED ORAL at 09:06

## 2017-06-15 RX ADMIN — AMITRIPTYLINE HYDROCHLORIDE 10 MG: 10 TABLET, FILM COATED ORAL at 05:06

## 2017-06-15 NOTE — PT/OT/SLP PROGRESS
Physical Therapy  Treatment    Macie Charles   MRN: [id number]   Admitting Diagnosis: Colon polyps    PT Received On: 06/15/17  PT Start Time: 0800     PT Stop Time: 0830    PT Total Time (min): 30 min       Billable Minutes:  Gait [id number] and Therapeutic Exercise 15    Treatment Type: Treatment  PT/PTA: PT             General Precautions: Standard, fall  Orthopedic Precautions:     Braces:           Subjective:  Communicated with NURSE AND EPIC CHART REVIEW  prior to session.   PT AGREED TO TX     Pain/Comfort  Pain Rating 1: 5/10  Location - Side 1: Left  Location 1: abdomen  Pain Addressed 1: Pre-medicate for activity    Objective:   Patient found with: peripheral IV    Functional Mobility:  Bed Mobility:   Rolling/Turning to Left: Stand by assistance  Scooting/Bridging: Stand by Assistance  Supine to Sit: Stand by Assistance (HOB ELEVATED)    Transfers:  Sit <> Stand Assistance: Contact Guard Assistance  Sit <> Stand Assistive Device: Rolling Walker  Bed <> Chair Technique: Stand Pivot  Bed <> Chair Assistance: Contact Guard Assistance  Bed <> Chair Assistive Device: Rolling Walker    Gait:   Gait Distance: PT GT TRAINED WITH RW AND SLOW PACE GT X 40'   Assistance 1: Contact Guard Assistance  Gait Assistive Device: Rolling walker  Gait Pattern: swing-to gait  Gait Deviation(s): decreased bryant, decreased weight-shifting ability    Therapeutic Activities and Exercises:  PT GT TRAINED AND RETURNED TO RM T/F TO CHAIR WITH RW AND CGA. PT COMPLETED SEATED TE X 20 REPS FOR STRENGTHENING AP TKE, MIP. PT LEFT SEATED WITH ALL NEEDS MET      AM-PAC 6 CLICK MOBILITY  How much help from another person does this patient currently need?   1 = Unable, Total/Dependent Assistance  2 = A lot, Maximum/Moderate Assistance  3 = A little, Minimum/Contact Guard/Supervision  4 = None, Modified Cotton/Independent    Turning over in bed (including adjusting bedclothes, sheets and blankets)?: 4  Sitting down on and  standing up from a chair with arms (e.g., wheelchair, bedside commode, etc.): 4  Moving from lying on back to sitting on the side of the bed?: 4  Moving to and from a bed to a chair (including a wheelchair)?: 4  Need to walk in hospital room?: 3  Climbing 3-5 steps with a railing?: 1  Total Score: 20    AM-PAC Raw Score CMS G-Code Modifier Level of Impairment Assistance   6 % Total / Unable   7 - 9 CM 80 - 100% Maximal Assist   10 - 14 CL 60 - 80% Moderate Assist   15 - 19 CK 40 - 60% Moderate Assist   20 - 22 CJ 20 - 40% Minimal Assist   23 CI 1-20% SBA / CGA   24 CH 0% Independent/ Mod I     Patient left up in chair with call button in reach.    Assessment:  PT PROGRESSING WITH GT . PT CONT REPORTING DIZZINESS DURING GT.     Rehab identified problem list/impairments: Rehab identified problem list/impairments: weakness, impaired endurance, impaired functional mobilty, impaired balance, pain, gait instability, impaired self care skills    Rehab potential is good.    Activity tolerance: Fair    Discharge recommendations: Discharge Facility/Level Of Care Needs: home     Barriers to discharge: Barriers to Discharge: None    Equipment recommendations: Equipment Needed After Discharge: walker, rolling     GOALS:    Physical Therapy Goals        Problem: Physical Therapy Goal    Goal Priority Disciplines Outcome Goal Variances Interventions   Physical Therapy Goal     PT/OT, PT      Description:  PT WILL BE SEEN FOR P.T. FOR A MIN OF 5 OUT OF 7 DAYS A WEEK  LT17  1. PT WILL COMPLETE B LE TE X 20 REPS FOR STRENGTHENING.  2. PT WILL T/F TO CHAIR WITH RW AND S.   3. PT WILL GT TRAIN X 150' WITH OR WITHOUT RW AND S  4. PT WILL T/F TO CHAIR WITH RW AND S                      PLAN:    Patient to be seen    to address the above listed problems via gait training, therapeutic activities, therapeutic exercises  Plan of Care expires: 17  Plan of Care reviewed with: patient         Lakeshia Whyte,  PT  06/15/2017

## 2017-06-15 NOTE — ASSESSMENT & PLAN NOTE
S/p robotic right hemicolectomy  Post op day2: clears advance diet as tolerated  Adjust pain meds  Ambulate, IS, PT  Restart home meds  DVT/GI prophylaxis

## 2017-06-15 NOTE — PROGRESS NOTES
[facility]  General Surgery  Progress Note    Subjective:     History of Present Illness:  No notes on file    Post-Op Info:  Procedure(s) (LRB):  ROBOTIC ASSISTED COLECTOMY-LAPAROSCOPIC (N/A)   2 Days Post-Op     Interval History: c/o of pain, +flatus/BM; some nausea/-emesis    Medications:  Continuous Infusions:   dextrose 5 % and 0.45 % NaCl with KCl 20 mEq 75 mL/hr at 06/15/17 0857     Scheduled Meds:   alvimopan  12 mg Oral BID    doxepin  10 mg Oral QHS    gabapentin  100 mg Oral QHS    pantoprazole  40 mg Intravenous Before breakfast     PRN Meds:alprazolam, dextrose 50%, diphenhydrAMINE, glucagon (human recombinant), HYDROmorphone, insulin aspart, ondansetron, oxycodone, promethazine (PHENERGAN) IVPB     Review of patient's allergies indicates:   Allergen Reactions    Ceftin [cefuroxime axetil] Rash    Ciprofloxacin Rash    Morphine Rash    Pcn [penicillins] Rash    Poison ivy extract Rash     Objective:     Vital Signs (Most Recent):  Temp: 97.9 °F (36.6 °C) (06/15/17 0743)  Pulse: 92 (06/15/17 0743)  Resp: 16 (06/15/17 0743)  BP: (!) 158/76 (06/15/17 0743)  SpO2: 96 % (06/15/17 0743) Vital Signs (24h Range):  Temp:  [97.9 °F (36.6 °C)-98.9 °F (37.2 °C)] 97.9 °F (36.6 °C)  Pulse:  [] 92  Resp:  [14-18] 16  SpO2:  [92 %-100 %] 96 %  BP: (140-162)/(73-84) 158/76     Weight: 54.5 kg (120 lb 2.4 oz)  Body mass index is 23.47 kg/m².    Intake/Output - Last 3 Shifts       06/13 0700 - 06/14 0659 06/14 0700 - 06/15 0659 06/15 0700 - 06/16 0659    P.O. 0 400     I.V. (mL/kg) 3619 (66.4) 19.1 (0.4)     IV Piggyback 200 150     Total Intake(mL/kg) 3819 (70.1) 569.1 (10.4)     Urine (mL/kg/hr) 1860      Stool 0      Blood 15      Total Output 1875        Net +1944 +569.1             Urine Occurrence  7 x     Stool Occurrence 0 x 2 x           Physical Exam   Constitutional: She is oriented to person, place, and time. She appears well-developed and well-nourished.   HENT:   Head:  Normocephalic and atraumatic.   Eyes: EOM are normal.   Cardiovascular: Normal rate and regular rhythm.    Pulmonary/Chest: Effort normal and breath sounds normal. No respiratory distress.   Abdominal: Soft. She exhibits no distension. There is tenderness (incisional).   Musculoskeletal: Normal range of motion.   Neurological: She is alert and oriented to person, place, and time.   Skin: Skin is warm and dry.   Psychiatric: She has a normal mood and affect. Thought content normal.   Vitals reviewed.      Significant Labs:  CBC:     Recent Labs  Lab 06/14/17  0534   WBC 13.10*   RBC 4.07   HGB 12.0   HCT 36.6*      MCV 90   MCH 29.5   MCHC 32.8     CMP:     Recent Labs  Lab 06/14/17  0534   GLU 98   CALCIUM 8.6*      K 4.0   CO2 25      BUN 5*   CREATININE 0.7       Significant Diagnostics:  NA    Assessment/Plan:     * Colon polyps    S/p robotic right hemicolectomy  Post op day2: clears advance diet as tolerated  Adjust pain meds  Ambulate, IS, PT  Restart home meds  DVT/GI prophylaxis              Haroldo Robles MD  General Surgery  [facility]

## 2017-06-15 NOTE — PLAN OF CARE
Problem: Patient Care Overview  Goal: Plan of Care Review  PT REQUIRES MIN A FOR GT X 20' WITH RW   Outcome: Ongoing (interventions implemented as appropriate)  Pt remains free of injury, pain managed adequately, no s/s of distress. 24 hour chart check completed. Will continue to monitor.

## 2017-06-15 NOTE — PLAN OF CARE
Problem: Patient Care Overview  Goal: Plan of Care Review  PT REQUIRES MIN A FOR GT X 20' WITH RW   Outcome: Ongoing (interventions implemented as appropriate)  PT REQUIRES CGA WITH RW X 40' WITH SLOW PACE

## 2017-06-15 NOTE — SUBJECTIVE & OBJECTIVE
Interval History: c/o of pain, +flatus/BM; some nausea/-emesis    Medications:  Continuous Infusions:   dextrose 5 % and 0.45 % NaCl with KCl 20 mEq 75 mL/hr at 06/15/17 0857     Scheduled Meds:   alvimopan  12 mg Oral BID    doxepin  10 mg Oral QHS    gabapentin  100 mg Oral QHS    pantoprazole  40 mg Intravenous Before breakfast     PRN Meds:alprazolam, dextrose 50%, diphenhydrAMINE, glucagon (human recombinant), HYDROmorphone, insulin aspart, ondansetron, oxycodone, promethazine (PHENERGAN) IVPB     Review of patient's allergies indicates:   Allergen Reactions    Ceftin [cefuroxime axetil] Rash    Ciprofloxacin Rash    Morphine Rash    Pcn [penicillins] Rash    Poison ivy extract Rash     Objective:     Vital Signs (Most Recent):  Temp: 97.9 °F (36.6 °C) (06/15/17 0743)  Pulse: 92 (06/15/17 0743)  Resp: 16 (06/15/17 0743)  BP: (!) 158/76 (06/15/17 0743)  SpO2: 96 % (06/15/17 0743) Vital Signs (24h Range):  Temp:  [97.9 °F (36.6 °C)-98.9 °F (37.2 °C)] 97.9 °F (36.6 °C)  Pulse:  [] 92  Resp:  [14-18] 16  SpO2:  [92 %-100 %] 96 %  BP: (140-162)/(73-84) 158/76     Weight: 54.5 kg (120 lb 2.4 oz)  Body mass index is 23.47 kg/m².    Intake/Output - Last 3 Shifts       06/13 0700 - 06/14 0659 06/14 0700 - 06/15 0659 06/15 0700 - 06/16 0659    P.O. 0 400     I.V. (mL/kg) 3619 (66.4) 19.1 (0.4)     IV Piggyback 200 150     Total Intake(mL/kg) 3819 (70.1) 569.1 (10.4)     Urine (mL/kg/hr) 1860      Stool 0      Blood 15      Total Output 1875        Net +1944 +569.1             Urine Occurrence  7 x     Stool Occurrence 0 x 2 x           Physical Exam   Constitutional: She is oriented to person, place, and time. She appears well-developed and well-nourished.   HENT:   Head: Normocephalic and atraumatic.   Eyes: EOM are normal.   Cardiovascular: Normal rate and regular rhythm.    Pulmonary/Chest: Effort normal and breath sounds normal. No respiratory distress.   Abdominal: Soft. She exhibits no distension.  There is tenderness (incisional).   Musculoskeletal: Normal range of motion.   Neurological: She is alert and oriented to person, place, and time.   Skin: Skin is warm and dry.   Psychiatric: She has a normal mood and affect. Thought content normal.   Vitals reviewed.      Significant Labs:  CBC:     Recent Labs  Lab 06/14/17  0534   WBC 13.10*   RBC 4.07   HGB 12.0   HCT 36.6*      MCV 90   MCH 29.5   MCHC 32.8     CMP:     Recent Labs  Lab 06/14/17  0534   GLU 98   CALCIUM 8.6*      K 4.0   CO2 25      BUN 5*   CREATININE 0.7       Significant Diagnostics:  NA

## 2017-06-16 VITALS
SYSTOLIC BLOOD PRESSURE: 160 MMHG | TEMPERATURE: 98 F | WEIGHT: 120.13 LBS | HEART RATE: 87 BPM | RESPIRATION RATE: 18 BRPM | OXYGEN SATURATION: 97 % | BODY MASS INDEX: 23.58 KG/M2 | DIASTOLIC BLOOD PRESSURE: 88 MMHG | HEIGHT: 60 IN

## 2017-06-16 LAB
POCT GLUCOSE: 118 MG/DL (ref 70–110)
POCT GLUCOSE: 120 MG/DL (ref 70–110)

## 2017-06-16 PROCEDURE — 25000003 PHARM REV CODE 250: Performed by: SURGERY

## 2017-06-16 PROCEDURE — C9113 INJ PANTOPRAZOLE SODIUM, VIA: HCPCS | Performed by: SURGERY

## 2017-06-16 PROCEDURE — 94799 UNLISTED PULMONARY SVC/PX: CPT

## 2017-06-16 PROCEDURE — 63600175 PHARM REV CODE 636 W HCPCS: Performed by: SURGERY

## 2017-06-16 PROCEDURE — 97116 GAIT TRAINING THERAPY: CPT

## 2017-06-16 RX ORDER — OXYCODONE AND ACETAMINOPHEN 7.5; 325 MG/1; MG/1
1 TABLET ORAL EVERY 4 HOURS PRN
Qty: 30 TABLET | Refills: 0 | Status: ON HOLD | OUTPATIENT
Start: 2017-06-16 | End: 2018-05-03 | Stop reason: ALTCHOICE

## 2017-06-16 RX ADMIN — AMITRIPTYLINE HYDROCHLORIDE 10 MG: 10 TABLET, FILM COATED ORAL at 08:06

## 2017-06-16 RX ADMIN — ESTRADIOL 1 MG: 1 TABLET ORAL at 08:06

## 2017-06-16 RX ADMIN — OXYCODONE HYDROCHLORIDE AND ACETAMINOPHEN 1 TABLET: 10; 325 TABLET ORAL at 10:06

## 2017-06-16 RX ADMIN — PANTOPRAZOLE SODIUM 40 MG: 40 INJECTION, POWDER, FOR SOLUTION INTRAVENOUS at 05:06

## 2017-06-16 RX ADMIN — FUROSEMIDE 20 MG: 20 TABLET ORAL at 08:06

## 2017-06-16 RX ADMIN — PERPHENAZINE 2 MG: 2 TABLET, FILM COATED ORAL at 05:06

## 2017-06-16 RX ADMIN — IBUPROFEN 400 MG: 400 TABLET, FILM COATED ORAL at 05:06

## 2017-06-16 NOTE — DISCHARGE INSTRUCTIONS
[facility] General Surgery  Instructions for Patients and Families    If you have a smartphone with a front camera, you can now do a video visit instead of an office visit after surgery.  Please contact us at [phone] to schedule this or to convert your scheduled appointment to a video visit!    Before surgery:  The pre-op nurse from the hospital will call you before the day of your surgery to confirm your arrival time.  The time of your surgery may change due to emergencies or other unforeseen events.  Do not eat or drink anything after midnight the night before your procedure, except for clear liquids up to three hours before your surgery time, and sips of water with medication.  If you are not diabetic, it is recommended that you drink a glass of clear fruit juice (apple, grape, cranberry, not orange) three hours before your surgery time, but nothing after that.  If you are diabetic, you may have water or sugar-free clear liquids such as Crystal Light up to three hours before your surgery time.    Day of Surgery:  · You will go to a pre-op area where an IV will be started and you will speak to the anesthesiologist and surgeon.  · Your family will be updated throughout the operation.  After surgery, your family member may be taken to a private room for consultation with the surgeon.  This is for the privacy of your medical information and does not necessarily mean there is anything wrong.    If your incisions have:  · Glue:  You may take a bath or shower immediately and wash your skin as you normally do.  The glue will eventually crumble or peel off. Do not let your incisions soak under water.  · Strips: Leave them on, but it is OK if they fall off on their own. It is OK to get them wet 48 hours after surgery.  · Bandage: You may remove it 2 days after your surgery, and then you may leave the incision open and take a shower or bath, unless otherwise instructed. If your bandage has clear plastic, you  may shower with it on and then remove it 2 days after surgery.    Activities  · Walking is recommended after surgery; bed rest is not recommended unless specifically ordered.  · If you have had abdominal surgery, do not lift over 20 pounds for 4 weeks after surgery.  · If you have had hernia surgery, do not lift over 20 pounds for 6 weeks after surgery.  · You may drive when you are off your post-operative pain medication.  · Do not smoke after surgery, it decreases your ability to heal and increases the risk of infection and pneumonia.    Diet:  Drink lots of fluids after surgery.  You might not have much of an appetite at first, you may eat regular food when you feel ready, unless you are given special diet instructions.    Post-operative symptoms and medications  · It is safe to take over-the-counter medications for constipation, heartburn, sleep, or itching if needed.  Prescription pain medication may contain acetaminophen (Tylenol), so you should not take additional acetaminophen (Tylenol) at the same time as your pain medication.  · You may experience nausea, low fever/chills, and clear drainage from your incision, sometimes up to a month after surgery.  Notify our office if you have fever over 101 degrees, worsening redness around your incision, thick cloudy drainage, or inability to drink any liquids.  · You will experience some level of pain after surgery.  Your pain medication should help with the pain, but may not be able to eliminate it entirely.  Pain will decrease with time, and most pain will be gone by 4 to 6 weeks after surgery.  · We are not able to call in prescriptions for pain medication after hours or on weekends.  If your pain medication is ineffective or you will run out soon and need a refill, please call our office at [phone].  We are not able to replace pain medication that has been lost or stolen.    After surgery, you will either be discharged home or admitted to the hospital.  If  "you are admitted to the hospital, one of the surgeons or a physician assistant will see you once a day.  Due to scheduled surgery, we may see you in the afternoon or at night; however, your nurse is able to page us at any time.  If you feel there is a situation that is not being addressed properly, please dial [phone] from the phone in your room.    Follow-up appointment  · You will see your surgeon or a physician assistant in clinic for a follow-up appointment at either our [facility] (off [facility]) or [facility] (off [facility]) locations, usually between one and four weeks after your surgery.  · The hospital nurses can make your follow up appointment, or you can make it online at [email or web address] or call [phone].  · If you have a smartphone with the [facility] inocencio, please let us know if you would like to do a video visit with your phone instead of a post-op office visit.    If you are signed up for [facility], install the "[facility]" inocencio to access your test results, send messages to your doctors, and schedule appointments from your smartphone!    You are invited to share your experience with me and my staff.  If you receive a survey in the mail, please return it at your convenience, or complete a brief survey on [employer].  We value your opinion!    Go to: [email or web address]  "

## 2017-06-16 NOTE — PLAN OF CARE
Patient discharging today.  Reviewed in Epic that a rolling walker has been recommended for patient upon discharge.    Met with patient and patient's family in hospital room.  Patient indicates that she does not want a rolling walker arranged for her at this time.  Explained to patient that should she change her mind about rolling walker, she can contact her PCP to arrange.  Outpatient follow-up appointment with [facility] general surgeon has been made for patient.      D/C PLAN:  Home with family, with recommended outpatient MD follow-up; Patient declining need for rolling walker to be arranged by this  at discharge        06/16/17 1139   Final Note   Assessment Type Final Discharge Note   Discharge Disposition Home   What phone number can be called within the next 1-3 days to see how you are doing after discharge? [phone]   Hospital Follow Up  Appt(s) scheduled? Yes   Referral to Outpatient Case Management complete? n/a   Referral to / orders for Home Health Complete? n/a   Any social issues identified prior to discharge? No   Did you assess the readiness or willingness of the family or caregiver to support self management of care? Yes   Right Care Referral Info   Post Acute Recommendation No Care

## 2017-06-16 NOTE — NURSING
IV removed. D/c instructions provided. Pt instructed to call at 1045, 30 minutes after oxycodone 10 mg PO, for w/c. GABRIELLA

## 2017-06-16 NOTE — PLAN OF CARE
Problem: Patient Care Overview  Goal: Plan of Care Review  PT REQUIRES MIN A FOR GT X 20' WITH RW   Outcome: Ongoing (interventions implemented as appropriate)  Fall precautions in place. Side rails up. Bed locked and low in position. Call light and personal items within reach. 24 hour order chart check completed. Pt educated on medication's side effects. Pt verbalized understanding.   Will continue to monitor.

## 2017-06-16 NOTE — DISCHARGE SUMMARY
[facility] -   General Surgery  Discharge Summary      Patient Name: Macie Charles  MRN: [id number]  Admission Date: 6/13/2017  Hospital Length of Stay: 3 days  Discharge Date and Time:  06/16/2017 9:08 AM  Attending Physician: Amari Stallings MD   Discharging Provider: Megan Peraza PA-C  Primary Care Provider: rAturo Camacho DO    HPI:   No notes on file    Procedure(s) (LRB):  ROBOTIC ASSISTED COLECTOMY-LAPAROSCOPIC (N/A)      Indwelling Lines/Drains at time of discharge:   Lines/Drains/Airways          No matching active lines, drains, or airways        Hospital Course: S/p robot assisted laparoscopic right hemicolectomy.   Postop day 1: doing well. No nausea, pain controlled.   Postop day 2: c/o of pain, +flatus/BM; some nausea/-emesis  Postop day 3: doing well. Pain is well controlled. tolerating diet and having bowel function.Plan for d/c today.     Consults:   Consults         Status Ordering Provider     Consult to Case Management/Social Work  Once     Provider:  (Not yet assigned)    Completed AMARI STALLINGS          Significant Diagnostic Studies: Labs: BMP: No results for input(s): GLU, NA, K, CL, CO2, BUN, CREATININE, CALCIUM, MG in the last 48 hours. and CBC No results for input(s): WBC, HGB, HCT, PLT in the last 48 hours.    Pending Diagnostic Studies:     None        Final Active Diagnoses:    Diagnosis Date Noted POA    PRINCIPAL PROBLEM:  Colon polyps [K63.5] 04/25/2017 Yes      Problems Resolved During this Admission:    Diagnosis Date Noted Date Resolved POA      Discharged Condition: good    Disposition: Home or Self Care    Follow Up: Dr. Stallings as scheduled.    Patient Instructions:     Diet general     Lifting restrictions   Order Comments: Avoid lifting over 20lbs     Call MD for:  persistent nausea and vomiting or diarrhea     Call MD for:  temperature >100.4     Call MD for:  severe uncontrolled pain     Call MD for:  redness, tenderness, or signs of infection  (pain, swelling, redness, odor or green/yellow discharge around incision site)     Call MD for:  difficulty breathing or increased cough     Call MD for:  increased confusion or weakness     No dressing needed       Medications:  Reconciled Home Medications:   Current Discharge Medication List      START taking these medications    Details   oxycodone-acetaminophen (PERCOCET) 7.5-325 mg per tablet Take 1 tablet by mouth every 4 (four) hours as needed for Pain. Take 1 or 2 tablets every 4 hrs as needed  Qty: 30 tablet, Refills: 0         CONTINUE these medications which have NOT CHANGED    Details   alprazolam (XANAX) 1 MG tablet TK 1 T PO TID,prn  Refills: 5      doxepin (SINEQUAN) 10 MG capsule Take 10 mg by mouth every evening.      estradiol (ESTRACE) 1 MG tablet TK 1 T PO QD  Refills: 5      FLUTICASONE PROPIONATE (FLONASE NASL) by Nasal route 2 (two) times daily as needed.      furosemide (LASIX) 20 MG tablet TK 1 T PO QD  Refills: 5      gabapentin (NEURONTIN) 100 MG capsule Take 100 mg by mouth every evening.       perphenazine-amitriptyline 2-10 mg (TRIAVIL 2-10) 2-10 mg Tab Take 1 tablet by mouth 2 (two) times daily.      promethazine (PHENERGAN) 25 MG tablet Take 1 tablet (25 mg total) by mouth every 6 (six) hours as needed for Nausea.  Qty: 6 tablet, Refills: 0      hydrocodone-acetaminophen 7.5-325mg (NORCO) 7.5-325 mg per tablet TK 1 T PO Q 6 TO 8 H PRN P  Refills: 0      ibandronate (BONIVA) 150 mg tablet TK 1 T PO Q MONTH  Refills: 5      neomycin (MYCIFRADIN) 500 mg Tab Take 1 tablet (500 mg total) by mouth as needed (see instructions). Take 2 tablets at 1 pm, 2pm, and 11pm the day prior to surgery  Qty: 6 tablet, Refills: 0           Time spent on the discharge of patient: 30 minutes    Megan Peraza PA-C  General Surgery  [facility]

## 2017-06-16 NOTE — PT/OT/SLP PROGRESS
"Physical Therapy  Treatment    Macie Charles   MRN: [id number]   Admitting Diagnosis: Colon polyps    PT Received On: 06/16/17  PT Start Time: 1032     PT Stop Time: 1042    PT Total Time (min): 10 min       Billable Minutes:  Gait [id number]    Treatment Type: Treatment  PT/PTA: PT             General Precautions: Standard, fall  Orthopedic Precautions:     Braces:           Subjective:  Communicated with NURSE CONROY AND EPIC CHART REVIEW  prior to session.   PT AGREED TO TX     Pain/Comfort  Pain Rating 1: 0/10  Pain Rating Post-Intervention 1: 0/10    Objective:   Patient found with: peripheral IV    Functional Mobility:  Bed Mobility:        Transfers:  Sit <> Stand Assistance: Supervision  Sit <> Stand Assistive Device: No Assistive Device  Bed <> Chair Technique: Stand Pivot  Bed <> Chair Assistance: Supervision  Bed <> Chair Assistive Device: No Assistive Device    Gait:   Gait Distance: PT GT TRAINED X 100' WITH SLOW PACE GT AND NO AD   Assistance 1: Supervision  Gait Assistive Device: No device  Gait Pattern: swing-through gait  Gait Deviation(s): decreased bryant    Therapeutic Activities and Exercises:  PT MET IN  SEATED IN CHAIR. " IM ABOUT TO GO HOME." PT STOOD WITH S AND GT TRAINED X 100'. PT RETURNED TO  T/F TO CHAIR WITH S AND LEFT SEATED WITH ALL NEEDS MET      AM-PAC 6 CLICK MOBILITY  How much help from another person does this patient currently need?   1 = Unable, Total/Dependent Assistance  2 = A lot, Maximum/Moderate Assistance  3 = A little, Minimum/Contact Guard/Supervision  4 = None, Modified Centre/Independent    Turning over in bed (including adjusting bedclothes, sheets and blankets)?: 4  Sitting down on and standing up from a chair with arms (e.g., wheelchair, bedside commode, etc.): 4  Moving from lying on back to sitting on the side of the bed?: 4  Moving to and from a bed to a chair (including a wheelchair)?: 4  Need to walk in hospital room?: 4  Climbing 3-5 steps " with a railing?: 1  Total Score: 21    AM-PAC Raw Score CMS G-Code Modifier Level of Impairment Assistance   6 % Total / Unable   7 - 9 CM 80 - 100% Maximal Assist   10 - 14 CL 60 - 80% Moderate Assist   15 - 19 CK 40 - 60% Moderate Assist   20 - 22 CJ 20 - 40% Minimal Assist   23 CI 1-20% SBA / CGA   24 CH 0% Independent/ Mod I     Patient left up in chair with ALL NEEDS MET    Assessment:  PT PROGRESSING WITH GT TRAINING.     Rehab identified problem list/impairments: Rehab identified problem list/impairments: weakness, impaired endurance, impaired functional mobilty, impaired balance, pain, gait instability, impaired self care skills    Rehab potential is excellent.    Activity tolerance: Good    Discharge recommendations: Discharge Facility/Level Of Care Needs: home     Barriers to discharge: Barriers to Discharge: None    Equipment recommendations: Equipment Needed After Discharge: walker, rolling     GOALS:    Physical Therapy Goals     Not on file          Multidisciplinary Problems (Resolved)        Problem: Physical Therapy Goal    Goal Priority Disciplines Outcome Goal Variances Interventions   Physical Therapy Goal   (Resolved)     PT/OT, PT Outcome(s) achieved     Description:  PT WILL BE SEEN FOR P.T. FOR A MIN OF 5 OUT OF 7 DAYS A WEEK  LT17  1. PT WILL COMPLETE B LE TE X 20 REPS FOR STRENGTHENING.  2. PT WILL T/F TO CHAIR WITH RW AND S.   3. PT WILL GT TRAIN X 150' WITH OR WITHOUT RW AND S  4. PT WILL T/F TO CHAIR WITH RW AND S                      PLAN:    Patient to be seen    to address the above listed problems via gait training, therapeutic activities, therapeutic exercises  Plan of Care expires: 17  Plan of Care reviewed with: patient         Lakeshia Whyte, PT  2017

## 2017-06-16 NOTE — PLAN OF CARE
06/16/17 1146   Medicare Message   Important Message from Medicare regarding Discharge Appeal Rights Given to patient/caregiver;Explained to patient/caregiver;Signed/date by patient/caregiver   Date IMM was signed 06/16/17   Time IMM was signed 1020

## 2017-07-05 ENCOUNTER — OFFICE VISIT (OUTPATIENT)
Dept: SURGERY | Facility: CLINIC | Age: 72
End: 2017-07-05
Payer: MEDICARE

## 2017-07-05 VITALS
TEMPERATURE: 99 F | DIASTOLIC BLOOD PRESSURE: 80 MMHG | HEART RATE: 68 BPM | BODY MASS INDEX: 23.55 KG/M2 | WEIGHT: 120.56 LBS | SYSTOLIC BLOOD PRESSURE: 122 MMHG

## 2017-07-05 DIAGNOSIS — Z90.49 STATUS POST PARTIAL COLECTOMY: ICD-10-CM

## 2017-07-05 DIAGNOSIS — Z86.010 HISTORY OF COLON POLYPS: Primary | ICD-10-CM

## 2017-07-05 PROCEDURE — 99213 OFFICE O/P EST LOW 20 MIN: CPT | Mod: PBBFAC | Performed by: SURGERY

## 2017-07-05 PROCEDURE — 99999 PR PBB SHADOW E&M-EST. PATIENT-LVL III: CPT | Mod: PBBFAC,,, | Performed by: SURGERY

## 2017-07-05 PROCEDURE — 99024 POSTOP FOLLOW-UP VISIT: CPT | Mod: ,,, | Performed by: SURGERY

## 2017-07-05 NOTE — PROGRESS NOTES
Subjective:       Patient ID: Macie Charles is a 71 y.o. female.    Chief Complaint: No chief complaint on file.    HPI   Status post robotic right hemicolectomy 6/13/17 presents for postop.  She is doing well without complaints today.  Her bowel function has been fairly normal with only one episode of constipation since surgery (normally has constipation and baseline nausea) and she is tolerating a regular diet but does not have much of an appetite.  Review of Systems    Objective:      Physical Exam   Constitutional: She appears well-developed and well-nourished.   Abdominal: Soft. She exhibits no distension. There is no tenderness.   Incisions healing well with no signs of infection   Vitals reviewed.      FINAL PATHOLOGIC DIAGNOSIS  Right colon, hemicolectomy:  Multiple tubular adenomas.  Negative for high-grade dysplasia and malignancy.  Four benign lymph nodes.  Assessment:     status post robotic right hemicolectomy   Plan:       Doing well  Continue light duty ×4 weeks   Follow-up in 1 month  Will need colonoscopy in one year with Dr. Valverde

## 2017-07-10 ENCOUNTER — PATIENT MESSAGE (OUTPATIENT)
Dept: SURGERY | Facility: CLINIC | Age: 72
End: 2017-07-10

## 2017-08-09 ENCOUNTER — OFFICE VISIT (OUTPATIENT)
Dept: SURGERY | Facility: CLINIC | Age: 72
End: 2017-08-09
Payer: MEDICARE

## 2017-08-09 VITALS
DIASTOLIC BLOOD PRESSURE: 76 MMHG | WEIGHT: 124.13 LBS | SYSTOLIC BLOOD PRESSURE: 149 MMHG | HEART RATE: 87 BPM | BODY MASS INDEX: 24.24 KG/M2 | TEMPERATURE: 99 F

## 2017-08-09 DIAGNOSIS — K63.5 POLYP OF COLON, UNSPECIFIED PART OF COLON, UNSPECIFIED TYPE: Primary | ICD-10-CM

## 2017-08-09 DIAGNOSIS — Z90.49 STATUS POST PARTIAL COLECTOMY: ICD-10-CM

## 2017-08-09 PROCEDURE — 99213 OFFICE O/P EST LOW 20 MIN: CPT | Mod: PBBFAC | Performed by: SURGERY

## 2017-08-09 PROCEDURE — 99999 PR PBB SHADOW E&M-EST. PATIENT-LVL III: CPT | Mod: PBBFAC,,, | Performed by: SURGERY

## 2017-08-09 PROCEDURE — 99024 POSTOP FOLLOW-UP VISIT: CPT | Mod: ,,, | Performed by: SURGERY

## 2017-08-09 NOTE — PROGRESS NOTES
Subjective:       Patient ID: Macie Charles is a 71 y.o. female.    Chief Complaint: Post-op Evaluation    HPI   Status post robotic right hemicolectomy 6/13/17 presents for postop.  She is doing well and reports tolerating diet.  She does have some loose bowels which causes irritation when she has to wait frequently.  Her incisional pain is healed and she does not endorse any symptoms of constipation.  She reports some trouble with urination and lower extremity swelling.  Review of Systems    Objective:      Physical Exam   Constitutional: She appears well-developed and well-nourished.   Abdominal: Soft. She exhibits no distension. There is no tenderness.   Incisions healing well with no signs of infection   Vitals reviewed.      FINAL PATHOLOGIC DIAGNOSIS  Right colon, hemicolectomy:  Multiple tubular adenomas.  Negative for high-grade dysplasia and malignancy.  Four benign lymph nodes.  Assessment:     status post robotic right hemicolectomy   Plan:       Doing well  Discussed using fiber in her diet and medication such as Imodium, patient feels that her bowel function is improving and isn't very concerned  She will follow-up with Dr. Valverde for further surveillance colonoscopies  She can resume regular activity  Follow-up with me as needed

## 2018-02-22 ENCOUNTER — PATIENT MESSAGE (OUTPATIENT)
Dept: GASTROENTEROLOGY | Facility: CLINIC | Age: 73
End: 2018-02-22

## 2018-03-23 ENCOUNTER — PATIENT MESSAGE (OUTPATIENT)
Dept: GASTROENTEROLOGY | Facility: CLINIC | Age: 73
End: 2018-03-23

## 2018-03-23 DIAGNOSIS — K63.5 POLYP OF COLON, UNSPECIFIED PART OF COLON, UNSPECIFIED TYPE: Primary | ICD-10-CM

## 2018-03-23 RX ORDER — SODIUM, POTASSIUM,MAG SULFATES 17.5-3.13G
1 SOLUTION, RECONSTITUTED, ORAL ORAL ONCE
Qty: 1 BOTTLE | Refills: 0 | Status: SHIPPED | OUTPATIENT
Start: 2018-03-23 | End: 2018-03-23

## 2018-03-27 ENCOUNTER — PATIENT MESSAGE (OUTPATIENT)
Dept: GASTROENTEROLOGY | Facility: CLINIC | Age: 73
End: 2018-03-27

## 2018-04-16 ENCOUNTER — PATIENT MESSAGE (OUTPATIENT)
Dept: GASTROENTEROLOGY | Facility: CLINIC | Age: 73
End: 2018-04-16

## 2018-04-24 ENCOUNTER — PATIENT MESSAGE (OUTPATIENT)
Dept: ENDOSCOPY | Facility: HOSPITAL | Age: 73
End: 2018-04-24

## 2018-04-25 RX ORDER — HYDROCORTISONE 25 MG/G
CREAM TOPICAL 2 TIMES DAILY
Qty: 1 TUBE | Refills: 1 | Status: ON HOLD | OUTPATIENT
Start: 2018-04-25 | End: 2018-05-03

## 2018-05-03 ENCOUNTER — HOSPITAL ENCOUNTER (OUTPATIENT)
Facility: HOSPITAL | Age: 73
Discharge: HOME | End: 2018-05-03
Attending: INTERNAL MEDICINE | Admitting: INTERNAL MEDICINE
Payer: MEDICARE

## 2018-05-03 ENCOUNTER — ANESTHESIA EVENT (OUTPATIENT)
Dept: ENDOSCOPY | Facility: HOSPITAL | Age: 73
End: 2018-05-03
Payer: MEDICARE

## 2018-05-03 ENCOUNTER — SURGERY (OUTPATIENT)
Age: 73
End: 2018-05-03

## 2018-05-03 ENCOUNTER — ANESTHESIA (OUTPATIENT)
Dept: ENDOSCOPY | Facility: HOSPITAL | Age: 73
End: 2018-05-03
Payer: MEDICARE

## 2018-05-03 VITALS
HEIGHT: 60 IN | WEIGHT: 122 LBS | DIASTOLIC BLOOD PRESSURE: 77 MMHG | OXYGEN SATURATION: 100 % | HEART RATE: 72 BPM | SYSTOLIC BLOOD PRESSURE: 142 MMHG | BODY MASS INDEX: 23.95 KG/M2 | TEMPERATURE: 98 F | RESPIRATION RATE: 20 BRPM

## 2018-05-03 DIAGNOSIS — K63.5 COLON POLYPS: ICD-10-CM

## 2018-05-03 LAB — POCT GLUCOSE: 102 MG/DL (ref 70–110)

## 2018-05-03 PROCEDURE — 37000008 HC ANESTHESIA 1ST 15 MINUTES: Performed by: INTERNAL MEDICINE

## 2018-05-03 PROCEDURE — 27201012 HC FORCEPS, HOT/COLD, DISP: Performed by: INTERNAL MEDICINE

## 2018-05-03 PROCEDURE — 37000009 HC ANESTHESIA EA ADD 15 MINS: Performed by: INTERNAL MEDICINE

## 2018-05-03 PROCEDURE — 88305 TISSUE EXAM BY PATHOLOGIST: CPT | Performed by: PATHOLOGY

## 2018-05-03 PROCEDURE — 63600175 PHARM REV CODE 636 W HCPCS: Performed by: NURSE ANESTHETIST, CERTIFIED REGISTERED

## 2018-05-03 PROCEDURE — 25000003 PHARM REV CODE 250: Performed by: NURSE ANESTHETIST, CERTIFIED REGISTERED

## 2018-05-03 PROCEDURE — 25000003 PHARM REV CODE 250: Performed by: INTERNAL MEDICINE

## 2018-05-03 PROCEDURE — 45380 COLONOSCOPY AND BIOPSY: CPT | Mod: PT,,, | Performed by: INTERNAL MEDICINE

## 2018-05-03 PROCEDURE — 88305 TISSUE EXAM BY PATHOLOGIST: CPT | Mod: 26,,, | Performed by: PATHOLOGY

## 2018-05-03 PROCEDURE — 45380 COLONOSCOPY AND BIOPSY: CPT | Performed by: INTERNAL MEDICINE

## 2018-05-03 RX ORDER — SODIUM CHLORIDE, SODIUM LACTATE, POTASSIUM CHLORIDE, CALCIUM CHLORIDE 600; 310; 30; 20 MG/100ML; MG/100ML; MG/100ML; MG/100ML
INJECTION, SOLUTION INTRAVENOUS CONTINUOUS PRN
Status: DISCONTINUED | OUTPATIENT
Start: 2018-05-03 | End: 2018-05-03

## 2018-05-03 RX ORDER — LIDOCAINE HCL/PF 100 MG/5ML
SYRINGE (ML) INTRAVENOUS
Status: DISCONTINUED | OUTPATIENT
Start: 2018-05-03 | End: 2018-05-03

## 2018-05-03 RX ORDER — SOLIFENACIN SUCCINATE 5 MG/1
TABLET, FILM COATED ORAL DAILY
COMMUNITY

## 2018-05-03 RX ORDER — MELOXICAM 7.5 MG/1
7.5 TABLET ORAL DAILY
COMMUNITY

## 2018-05-03 RX ORDER — PROPOFOL 10 MG/ML
INJECTION, EMULSION INTRAVENOUS
Status: DISCONTINUED | OUTPATIENT
Start: 2018-05-03 | End: 2018-05-03

## 2018-05-03 RX ORDER — SODIUM CHLORIDE, SODIUM LACTATE, POTASSIUM CHLORIDE, CALCIUM CHLORIDE 600; 310; 30; 20 MG/100ML; MG/100ML; MG/100ML; MG/100ML
INJECTION, SOLUTION INTRAVENOUS CONTINUOUS
Status: DISCONTINUED | OUTPATIENT
Start: 2018-05-03 | End: 2018-05-03 | Stop reason: HOSPADM

## 2018-05-03 RX ADMIN — SODIUM CHLORIDE, SODIUM LACTATE, POTASSIUM CHLORIDE, AND CALCIUM CHLORIDE: 600; 310; 30; 20 INJECTION, SOLUTION INTRAVENOUS at 12:05

## 2018-05-03 RX ADMIN — PROPOFOL 60 MG: 10 INJECTION, EMULSION INTRAVENOUS at 01:05

## 2018-05-03 RX ADMIN — PROPOFOL 30 MG: 10 INJECTION, EMULSION INTRAVENOUS at 01:05

## 2018-05-03 RX ADMIN — SODIUM CHLORIDE, POTASSIUM CHLORIDE, SODIUM LACTATE AND CALCIUM CHLORIDE: 600; 310; 30; 20 INJECTION, SOLUTION INTRAVENOUS at 12:05

## 2018-05-03 RX ADMIN — LIDOCAINE HYDROCHLORIDE 50 MG: 20 INJECTION, SOLUTION INTRAVENOUS at 01:05

## 2018-05-03 NOTE — H&P
Short Stay Endoscopy History and Physical    PCP - Arturo Camacho, DO    Procedure - Colonoscopy  ASA - II  Mallampati - per anesthesia  History of Anesthesia problems - no  Family history Anesthesia problems -  no     HPI:  This is a 72 y.o. female here for evaluation of :  Screening for colon cancer    Average Risk Screening:no  Family history of colon cancer: No  History of polyps: yes  Anemia: No  Blood in stools: No  Diarrhea: No  Abdominal Pain: No    Review of Systems:  CONSTITUTIONAL: Denies weight change,  fatigue, fevers, chills, night sweats.  CARDIOVASCULAR: Denies chest pain, shortness of breath, orthopnea and edema.  RESPIRATORY: Denies cough, hemoptysis, dyspnea, and wheezing.  GI: See HPI.    Medical History:  Past Medical History:   Diagnosis Date    Anemia     Diabetes mellitus     diet controlled    Periodic limb movement disorder     PONV (postoperative nausea and vomiting)        Surgical History:   Past Surgical History:   Procedure Laterality Date    APPENDECTOMY      bladder hydrodistinction       COLON SURGERY      COLONOSCOPY N/A 3/15/2017    Procedure: COLONOSCOPY;  Surgeon: Jonathan Valverde MD;  Location: [facility];  Service: Endoscopy;  Laterality: N/A;    COLONOSCOPY N/A 3/14/2017    Procedure: COLONOSCOPY;  Surgeon: Eder Calvo MD;  Location: [facility];  Service: Endoscopy;  Laterality: N/A;    COLONOSCOPY N/A 4/25/2017    Procedure: COLONOSCOPY;  Surgeon: Jonathan Valverde MD;  Location: [facility];  Service: Endoscopy;  Laterality: N/A;    HYSTERECTOMY      TUBAL LIGATION         Family History:   Family History   Problem Relation Age of Onset    Heart disease Mother     Heart disease Father        Social History:   Social History   Substance Use Topics    Smoking status: Never Smoker    Smokeless tobacco: Never Used    Alcohol use Yes      Comment: 1 elina monthly  No alcohol prior to sx       Allergies: Reviewed.    Medications:  No current  facility-administered medications on file prior to encounter.      Current Outpatient Prescriptions on File Prior to Encounter   Medication Sig Dispense Refill    alprazolam (XANAX) 1 MG tablet TK 1 T PO TID,prn  5    estradiol (ESTRACE) 1 MG tablet TK 1 T PO QD  5    FLUTICASONE PROPIONATE (FLONASE NASL) by Nasal route 2 (two) times daily as needed.      furosemide (LASIX) 20 MG tablet TK 1 T PO QD  5    gabapentin (NEURONTIN) 100 MG capsule Take 100 mg by mouth every evening.       perphenazine-amitriptyline 2-10 mg (TRIAVIL 2-10) 2-10 mg Tab Take 1 tablet by mouth 2 (two) times daily.      promethazine (PHENERGAN) 25 MG tablet Take 1 tablet (25 mg total) by mouth every 6 (six) hours as needed for Nausea. 6 tablet 0    [DISCONTINUED] doxepin (SINEQUAN) 10 MG capsule Take 10 mg by mouth every evening.      [DISCONTINUED] hydrocodone-acetaminophen 7.5-325mg (NORCO) 7.5-325 mg per tablet TK 1 T PO Q 6 TO 8 H PRN P  0    [DISCONTINUED] ibandronate (BONIVA) 150 mg tablet TK 1 T PO Q MONTH  5    [DISCONTINUED] neomycin (MYCIFRADIN) 500 mg Tab Take 1 tablet (500 mg total) by mouth as needed (see instructions). Take 2 tablets at 1 pm, 2pm, and 11pm the day prior to surgery 6 tablet 0    [DISCONTINUED] oxycodone-acetaminophen (PERCOCET) 7.5-325 mg per tablet Take 1 tablet by mouth every 4 (four) hours as needed for Pain. Take 1 or 2 tablets every 4 hrs as needed 30 tablet 0       Physical Exam:  Vital Signs:   Vitals:    05/03/18 1215   BP: (!) 146/73   Pulse: 76   Resp: 10   Temp: 97.6 °F (36.4 °C)     General Appearance: Well appearing in no acute distress  ENT: OP clear  Chest: CTA B  CV: RRR, no m/r/g  Abd: s/nt/nd/nabs  Ext: no edema    Labs:  Lab Results   Component Value Date    WBC 13.10 (H) 06/14/2017    HGB 12.0 06/14/2017    HCT 36.6 (L) 06/14/2017    MCV 90 06/14/2017     06/14/2017     No results found for: INR, PROTIME  Lab Results   Component Value Date    IRON 68 11/15/2016    TIBC 348  11/15/2016    FERRITIN 120 11/15/2016         IMPRESSION:  Patient Active Problem List   Diagnosis    Chronic disease anemia    Monoclonal paraproteinemia    Depression    Night sweats    History of colon polyps    Altered bowel habits    Fecal incontinence alternating with constipation    Change in bowel function    Colon polyps         Plan:  I have explained the risks and benefits of colonoscopy to the patient including but not limited to bleeding, perforation, infection, and death. The patient wishes to proceed with colonoscopy.

## 2018-05-03 NOTE — ANESTHESIA PREPROCEDURE EVALUATION
05/03/2018  Macie Charles is a 72 y.o., female.    Anesthesia Evaluation    I have reviewed the Patient Summary Reports.    I have reviewed the Nursing Notes.   I have reviewed the Medications.     Review of Systems  Anesthesia Hx:  Hx of Anesthetic complications    Social:  Non-Smoker, Social Alcohol Use    Hematology/Oncology:     Oncology Normal    -- Anemia:   EENT/Dental:   chronic allergic rhinitis   Cardiovascular:   Exercise tolerance: good hyperlipidemia ECG has been reviewed. Normal sinus rhythm  Low voltage QRS  Borderline Abnormal ECG  No previous ECGs available  Confirmed by RAISSA HUERTA MD ([id number]) on 5/16/2017 6:31:19 PM   Pulmonary:   Snore   Renal/:  Renal/ Normal     Hepatic/GI:   Bowel Prep. GERD, well controlled 0730 last drink of fluid.   Musculoskeletal:   Arthritis     Neurological:  Neurology Normal    Endocrine:   Diabetes, well controlled, type 2    Dermatological:  Skin Normal    Psych:   Psychiatric History          Physical Exam  General:  Well nourished    Airway/Jaw/Neck:  Airway Findings: Mallampati: II                Anesthesia Plan  Type of Anesthesia, risks & benefits discussed:  Anesthesia Type:  MAC  Patient's Preference:   Intra-op Monitoring Plan:   Intra-op Monitoring Plan Comments:   Post Op Pain Control Plan:   Post Op Pain Control Plan Comments:   Induction:   IV  Beta Blocker:  Patient is not currently on a Beta-Blocker (No further documentation required).       Informed Consent: Patient understands risks and agrees with Anesthesia plan.  Questions answered. Anesthesia consent signed with patient.  ASA Score: 2     Day of Surgery Review of History & Physical: I have interviewed and examined the patient. I have reviewed the patient's H&P dated: 05/03/18. There are no significant changes.  H&P update referred to the provider.         Ready For Surgery From  Anesthesia Perspective.

## 2018-05-03 NOTE — ANESTHESIA POSTPROCEDURE EVALUATION
Anesthesia Post Evaluation    Patient: Macie Charles    Procedure(s) Performed: Procedure(s) (LRB):  COLONOSCOPY (N/A)    Final Anesthesia Type: MAC  Patient location during evaluation: PACU  Patient participation: Yes- Able to Participate  Level of consciousness: awake, awake and alert and oriented  Post-procedure vital signs: reviewed and stable  Pain management: adequate  Airway patency: patent  PONV status at discharge: No PONV  Anesthetic complications: no      Cardiovascular status: blood pressure returned to baseline  Respiratory status: spontaneous ventilation, unassisted and room air  Hydration status: euvolemic  Follow-up not needed.        Visit Vitals  BP (!) 146/73 (BP Location: Left arm, Patient Position: Sitting)   Pulse 76   Temp 36.4 °C (97.6 °F) (Oral)   Resp 10   Ht 5' (1.524 m)   Wt 55.3 kg (122 lb)   SpO2 98%   Breastfeeding? No   BMI 23.83 kg/m²       Pain/Alessio Score: Pain Assessment Performed: Yes (5/3/2018 12:06 PM)  Presence of Pain: denies (5/3/2018 12:06 PM)

## 2018-05-03 NOTE — DISCHARGE INSTRUCTIONS

## 2018-05-03 NOTE — PROVATION PATIENT INSTRUCTIONS
Discharge Summary/Instructions after an Endoscopic Procedure  Patient Name: Macie Charles  Patient MRN: [id number]  Patient YOB: 1945  Thursday, May 03, 2018 Jonathan Valverde MD  RESTRICTIONS:  During your procedure today, you received medications for sedation.  These   medications may affect your judgment, balance and coordination.  Therefore,   for 24 hours, you have the following restrictions:   - DO NOT drive a car, operate machinery, make legal/financial decisions,   sign important papers or drink alcohol.    ACTIVITY:  The following day: return to full activity including work, except no heavy   lifting, straining or running for 3 days if polyps were removed.  DIET:  Eat and drink normally unless instructed otherwise.     TREATMENT FOR COMMON SIDE EFFECTS:  - Mild abdominal pain, nausea, belching, bloating or excessive gas:  rest,   eat lightly and use a heating pad.  - Sore Throat: treat with throat lozenges and/or gargle with warm salt   water.  - Because air was used during the procedure, expelling large amounts of air   from your rectum or belching is normal.  - If a bowel prep was taken, you may not have a bowel movement for 1-3 days.    This is normal.  SYMPTOMS TO WATCH FOR AND REPORT TO YOUR PHYSICIAN:  1. Abdominal pain or bloating, other than gas cramps.  2. Chest pain.  3. Back pain.  4. Signs of infection such as: chills or fever occurring within 24 hours   after the procedure.  5. Rectal bleeding, which would show as bright red, maroon, or black stools.   (A tablespoon of blood from the rectum is not serious, especially if   hemorrhoids are present.)  6. Vomiting.  7. Weakness or dizziness.  GO DIRECTLY TO THE NEAREST EMERGENCY ROOM IF YOU HAVE ANY OF THE FOLLOWING:      Difficulty breathing              Chills and/or fever over 101 F   Persistent vomiting and/or vomiting blood   Severe abdominal pain   Severe chest pain   Black, tarry stools   Bleeding- more than one  tablespoon   Any other symptom or condition that you feel may need urgent attention  Your doctor recommends these additional instructions:  If any biopsies were taken, your doctors clinic will contact you in 1 to 2   weeks with any results.  - Patient has a contact number available for emergencies.  The signs and   symptoms of potential delayed complications were discussed with the   patient.  Return to normal activities tomorrow.  Written discharge   instructions were provided to the patient.   - Resume previous diet.   - Continue present medications.   - Await pathology results.   - Repeat colonoscopy in 3 years for surveillance.   - Return to primary care physician as previously scheduled.   - Discharge patient to home (with escort).  For questions, problems or results please call your physician Jonathan Valverde MD at Work:  [phone]  If you have any questions about the above instructions, call the GI   department at [phone] or call the endoscopy unit at [phone]   from 7am until 3 pm.  [facility] [place], EMERGENCY ROOM PHONE NUMBER:   [phone]  IF A COMPLICATION OR EMERGENCY SITUATION ARISES AND YOU ARE UNABLE TO REACH   YOUR PHYSICIAN - GO DIRECTLY TO THE EMERGENCY ROOM.  I have read or have had read to me these discharge instructions for my   procedure and have received a written copy.  I understand these   instructions and will follow-up with my physician if I have any questions.     __________________________________       _____________________________________  Nurse Signature                                          Patient/Designated   Responsible Party Signature  Jonathan Valverde MD  5/3/2018 1:36:38 PM  This report has been verified and signed electronically.

## 2018-05-03 NOTE — TRANSFER OF CARE
Anesthesia Transfer of Care Note    Patient: Macie Charles    Procedure(s) Performed: Procedure(s) (LRB):  COLONOSCOPY (N/A)    Patient location: PACU    Anesthesia Type: MAC    Transport from OR: Transported from OR on room air with adequate spontaneous ventilation    Post pain: adequate analgesia    Post assessment: no apparent anesthetic complications    Post vital signs: stable    Level of consciousness: responds to stimulation    Nausea/Vomiting: no nausea/vomiting    Complications: none    Transfer of care protocol was followed      Last vitals:   Visit Vitals  BP (!) 146/73 (BP Location: Left arm, Patient Position: Sitting)   Pulse 76   Temp 36.4 °C (97.6 °F) (Oral)   Resp 10   Ht 5' (1.524 m)   Wt 55.3 kg (122 lb)   SpO2 98%   Breastfeeding? No   BMI 23.83 kg/m²

## 2018-05-03 NOTE — ANESTHESIA RELEASE NOTE
Anesthesia Release from PACU Note    Patient: Macie Charles    Procedure(s) Performed: Procedure(s) (LRB):  COLONOSCOPY (N/A)    Anesthesia type: MAC    Post pain: Adequate analgesia    Post assessment: no apparent anesthetic complications, tolerated procedure well and no evidence of recall    Last Vitals:   Visit Vitals  BP (!) 146/73 (BP Location: Left arm, Patient Position: Sitting)   Pulse 76   Temp 36.4 °C (97.6 °F) (Oral)   Resp 10   Ht 5' (1.524 m)   Wt 55.3 kg (122 lb)   SpO2 98%   Breastfeeding? No   BMI 23.83 kg/m²       Post vital signs: stable    Level of consciousness: responds to stimulation    Nausea/Vomiting: no nausea/no vomiting    Complications: none    Airway Patency: patent    Respiratory: unassisted    Cardiovascular: stable and blood pressure at baseline    Hydration: euvolemic

## (undated) DEVICE — COVER OVERHEAD SURG LT BLUE

## (undated) DEVICE — SEE MEDLINE ITEM 152622

## (undated) DEVICE — Device

## (undated) DEVICE — SEE MEDLINE ITEM 152739

## (undated) DEVICE — MANIFOLD 4 PORT

## (undated) DEVICE — SUT SILK 3-0 BLK BR SH 30IN

## (undated) DEVICE — SUT CTD VICRYL 2-0 UND BR

## (undated) DEVICE — PACK DRAPE PERI/GYN TIBURON

## (undated) DEVICE — ADHESIVE MASTISOL VIAL 48/BX

## (undated) DEVICE — GLOVE SURG BIOGEL LATEX SZ 7.5

## (undated) DEVICE — SUT SILK 3-0 SH 18IN BLACK

## (undated) DEVICE — GAUZE SPONGE 4X4 12PLY

## (undated) DEVICE — SYR 10CC LUER LOCK

## (undated) DEVICE — SEE MEDLINE ITEM 157117

## (undated) DEVICE — IRRIGATOR ENDOSCOPY DISP.

## (undated) DEVICE — CANNULA REDUCER 12-8MM

## (undated) DEVICE — DRAPE COLUMN DAVINCI XI

## (undated) DEVICE — TUBING HEATED INSUFFLATOR

## (undated) DEVICE — SOL NS 1000CC

## (undated) DEVICE — DRAPE STERI LONG

## (undated) DEVICE — SEALER VESSEL DAVINCI XI

## (undated) DEVICE — NDL PNEUMO INSUFFLATI 120MM

## (undated) DEVICE — DRAPE ABDOMINAL TIBURON 14X11

## (undated) DEVICE — SYR 30CC LUER LOCK

## (undated) DEVICE — KIT ANTIFOG

## (undated) DEVICE — SUT PDS II O CT-2 VIL MONO

## (undated) DEVICE — CANNULA SEAL 12MM

## (undated) DEVICE — EVACUATOR KIT SMOKE PLUME AWAY

## (undated) DEVICE — SEE MEDLINE ITEM 146292

## (undated) DEVICE — SUTURE STRATAFIX PGAPCL 2 UNI

## (undated) DEVICE — SUT PDS II 1 TP-1 VIL

## (undated) DEVICE — SYR 3CC LUER LOC

## (undated) DEVICE — SUT VICRYL 2-0 STRAN J905T

## (undated) DEVICE — SHEATH ENDOWRIST 45MM

## (undated) DEVICE — SEAL UNIVERSAL 5MM-8MM XI

## (undated) DEVICE — ELECTRODE REM PLYHSV RETURN 9

## (undated) DEVICE — WARMER DRAPE STERILE LF

## (undated) DEVICE — SYR ONLY LUER LOCK 20CC

## (undated) DEVICE — APPLICATOR CHLORAPREP ORN 26ML

## (undated) DEVICE — COVER TIP CURVED SCISSORS XI

## (undated) DEVICE — SEE MEDLINE ITEM 157027

## (undated) DEVICE — DRAPE ARM DAVINCI XI

## (undated) DEVICE — STAPLER ENDOWRIST 45 WHITE XI

## (undated) DEVICE — SUT MONOCRYL 4.0 PS2 CP496G

## (undated) DEVICE — SUT VICRYL 0 SH

## (undated) DEVICE — STAPLER ENDOWRIST 45 BLUE XI

## (undated) DEVICE — SOL ELECTROLUBE ANTI-STIC

## (undated) DEVICE — SUT 1 48IN PDS II VIO MONO

## (undated) DEVICE — NDL HYPO SAFETY 22 X 1 1/2